# Patient Record
Sex: FEMALE | Race: WHITE
[De-identification: names, ages, dates, MRNs, and addresses within clinical notes are randomized per-mention and may not be internally consistent; named-entity substitution may affect disease eponyms.]

---

## 2018-08-24 ENCOUNTER — RESULT CHARGE (OUTPATIENT)
Age: 68
End: 2018-08-24

## 2018-08-24 ENCOUNTER — APPOINTMENT (OUTPATIENT)
Dept: ENDOCRINOLOGY | Facility: CLINIC | Age: 68
End: 2018-08-24
Payer: MEDICARE

## 2018-08-24 PROCEDURE — 97802 MEDICAL NUTRITION INDIV IN: CPT

## 2018-08-24 PROCEDURE — 95251 CONT GLUC MNTR ANALYSIS I&R: CPT

## 2018-09-04 ENCOUNTER — RECORD ABSTRACTING (OUTPATIENT)
Age: 68
End: 2018-09-04

## 2018-09-04 DIAGNOSIS — Z78.9 OTHER SPECIFIED HEALTH STATUS: ICD-10-CM

## 2018-09-04 DIAGNOSIS — Z87.19 PERSONAL HISTORY OF OTHER DISEASES OF THE DIGESTIVE SYSTEM: ICD-10-CM

## 2018-09-04 DIAGNOSIS — Z83.3 FAMILY HISTORY OF DIABETES MELLITUS: ICD-10-CM

## 2018-09-04 DIAGNOSIS — I25.2 OLD MYOCARDIAL INFARCTION: ICD-10-CM

## 2018-09-04 DIAGNOSIS — Z87.891 PERSONAL HISTORY OF NICOTINE DEPENDENCE: ICD-10-CM

## 2018-09-04 DIAGNOSIS — Z83.49 FAMILY HISTORY OF OTHER ENDOCRINE, NUTRITIONAL AND METABOLIC DISEASES: ICD-10-CM

## 2018-09-04 RX ORDER — IRON POLYSACCHARIDE COMPLEX 200 MG
CAPSULE ORAL
Refills: 0 | Status: ACTIVE | COMMUNITY

## 2018-09-17 ENCOUNTER — RESULT CHARGE (OUTPATIENT)
Age: 68
End: 2018-09-17

## 2018-09-17 ENCOUNTER — APPOINTMENT (OUTPATIENT)
Dept: ENDOCRINOLOGY | Facility: CLINIC | Age: 68
End: 2018-09-17
Payer: MEDICARE

## 2018-09-17 PROCEDURE — G0108 DIAB MANAGE TRN  PER INDIV: CPT

## 2018-09-17 PROCEDURE — 95251 CONT GLUC MNTR ANALYSIS I&R: CPT

## 2018-10-22 ENCOUNTER — APPOINTMENT (OUTPATIENT)
Dept: ENDOCRINOLOGY | Facility: CLINIC | Age: 68
End: 2018-10-22

## 2018-10-30 ENCOUNTER — RECORD ABSTRACTING (OUTPATIENT)
Age: 68
End: 2018-10-30

## 2018-10-30 ENCOUNTER — RX RENEWAL (OUTPATIENT)
Age: 68
End: 2018-10-30

## 2018-11-01 ENCOUNTER — APPOINTMENT (OUTPATIENT)
Dept: ENDOCRINOLOGY | Facility: CLINIC | Age: 68
End: 2018-11-01
Payer: MEDICARE

## 2018-11-01 ENCOUNTER — RESULT CHARGE (OUTPATIENT)
Age: 68
End: 2018-11-01

## 2018-11-01 VITALS
HEIGHT: 61.5 IN | SYSTOLIC BLOOD PRESSURE: 130 MMHG | DIASTOLIC BLOOD PRESSURE: 60 MMHG | WEIGHT: 198 LBS | HEART RATE: 70 BPM | BODY MASS INDEX: 36.9 KG/M2

## 2018-11-01 DIAGNOSIS — I25.10 ATHEROSCLEROTIC HEART DISEASE OF NATIVE CORONARY ARTERY W/OUT ANGINA PECTORIS: ICD-10-CM

## 2018-11-01 DIAGNOSIS — Z95.5 ATHEROSCLEROTIC HEART DISEASE OF NATIVE CORONARY ARTERY W/OUT ANGINA PECTORIS: ICD-10-CM

## 2018-11-01 PROCEDURE — 95251 CONT GLUC MNTR ANALYSIS I&R: CPT

## 2018-11-01 PROCEDURE — 99214 OFFICE O/P EST MOD 30 MIN: CPT | Mod: 25

## 2018-11-01 PROCEDURE — 82962 GLUCOSE BLOOD TEST: CPT

## 2018-11-01 RX ORDER — INSULIN GLARGINE 100 [IU]/ML
100 INJECTION, SOLUTION SUBCUTANEOUS
Refills: 0 | Status: DISCONTINUED | COMMUNITY
End: 2018-11-01

## 2018-11-01 RX ORDER — GABAPENTIN 100 MG/1
100 CAPSULE ORAL
Refills: 0 | Status: DISCONTINUED | COMMUNITY
End: 2018-11-01

## 2018-11-01 RX ORDER — INSULIN LISPRO 100 [IU]/ML
100 INJECTION, SOLUTION INTRAVENOUS; SUBCUTANEOUS
Refills: 0 | Status: DISCONTINUED | COMMUNITY
End: 2018-11-01

## 2018-11-05 LAB — GLUCOSE BLDC GLUCOMTR-MCNC: 125

## 2018-11-09 LAB
HBA1C MFR BLD HPLC: 8.1
LDLC SERPL CALC-MCNC: 74

## 2018-12-06 ENCOUNTER — APPOINTMENT (OUTPATIENT)
Dept: ENDOCRINOLOGY | Facility: CLINIC | Age: 68
End: 2018-12-06

## 2018-12-19 ENCOUNTER — TRANSCRIPTION ENCOUNTER (OUTPATIENT)
Age: 68
End: 2018-12-19

## 2018-12-21 ENCOUNTER — RX RENEWAL (OUTPATIENT)
Age: 68
End: 2018-12-21

## 2019-01-11 ENCOUNTER — RX RENEWAL (OUTPATIENT)
Age: 69
End: 2019-01-11

## 2019-01-24 ENCOUNTER — RECORD ABSTRACTING (OUTPATIENT)
Age: 69
End: 2019-01-24

## 2019-02-01 ENCOUNTER — RX RENEWAL (OUTPATIENT)
Age: 69
End: 2019-02-01

## 2019-02-11 ENCOUNTER — APPOINTMENT (OUTPATIENT)
Dept: ENDOCRINOLOGY | Facility: CLINIC | Age: 69
End: 2019-02-11
Payer: MEDICARE

## 2019-02-11 PROCEDURE — G0108 DIAB MANAGE TRN  PER INDIV: CPT

## 2019-02-14 ENCOUNTER — RESULT CHARGE (OUTPATIENT)
Age: 69
End: 2019-02-14

## 2019-02-20 ENCOUNTER — CHART COPY (OUTPATIENT)
Age: 69
End: 2019-02-20

## 2019-02-21 ENCOUNTER — APPOINTMENT (OUTPATIENT)
Dept: ENDOCRINOLOGY | Facility: CLINIC | Age: 69
End: 2019-02-21

## 2019-02-21 ENCOUNTER — APPOINTMENT (OUTPATIENT)
Dept: ENDOCRINOLOGY | Facility: CLINIC | Age: 69
End: 2019-02-21
Payer: MEDICARE

## 2019-02-21 VITALS
HEART RATE: 73 BPM | SYSTOLIC BLOOD PRESSURE: 120 MMHG | WEIGHT: 190 LBS | BODY MASS INDEX: 35.41 KG/M2 | OXYGEN SATURATION: 98 % | HEIGHT: 61.5 IN | DIASTOLIC BLOOD PRESSURE: 68 MMHG

## 2019-02-21 LAB
CHOLEST SERPL-MCNC: 152
GLUCOSE BLDC GLUCOMTR-MCNC: 144
GLUCOSE SERPL-MCNC: 163
HDLC SERPL-MCNC: 56
LDLC SERPL DIRECT ASSAY-MCNC: 73
MICROALBUMIN/CREAT UR-RTO: 1952

## 2019-02-21 PROCEDURE — 82962 GLUCOSE BLOOD TEST: CPT

## 2019-02-21 PROCEDURE — 99214 OFFICE O/P EST MOD 30 MIN: CPT | Mod: 25

## 2019-02-21 PROCEDURE — 95251 CONT GLUC MNTR ANALYSIS I&R: CPT

## 2019-02-21 RX ORDER — INSULIN GLARGINE 100 [IU]/ML
100 INJECTION, SOLUTION SUBCUTANEOUS
Qty: 3 | Refills: 1 | Status: DISCONTINUED | OUTPATIENT
Start: 2018-09-17 | End: 2019-02-21

## 2019-02-21 NOTE — HISTORY OF PRESENT ILLNESS
[FreeTextEntry1] : Quality: Type 2 DM\par Severity: moderate \par Duration: 1993\par Onset: inactive\par Modifying Factors: Better with medication \par Associated Symptoms: neuropathy. nephropathy. retinopathy  \par \par Current Regimen:\par Humalog 32 units before and before lunch, starting with 40 units before dinner plus sliding scale\par Lantus 35 units at HS\par \par Self blood sugar monitoring: [facility] Personal download - average glucose 190, BS below 70 1%, BS  44%, and BS above 180 55% - high blood sugars at dinner time\par once low of 63 corrected with carb\par \par exercise: gym, 3 times a week\par \par Diet:\par B- low carb wraps, coffee, eggs\par L- very little, sandwich\par D- meat, vegetables, potato, salads\par Snacks- fruit, \par \par Date of last eye exam: 12/2018 (+) diabetic retinopathy \par Date of last foot exam: 3 weeks ago\par Date of last flu vaccine: 2018\par Date of last Pneumovax: 2016

## 2019-02-21 NOTE — REVIEW OF SYSTEMS
[Decreased Appetite] : ~L decreased appetite [Recent Weight Loss (___ Lbs)] : recent [unfilled] ~Ulb weight loss [Neck Pain] : neck pain [SOB on Exertion] : shortness of breath during exertion [Polyuria] : polyuria [Headache] : headaches [Cold Intolerance] : cold intolerant [Easy Bruising] : a tendency for easy bruising [Swelling] : swelling [Fatigue] : no fatigue [Visual Field Defect] : no visual field defect [Blurry Vision] : no blurred vision [Dysphagia] : no dysphagia [Dysphonia] : no dysphonia [Chest Pain] : no chest pain [Palpitations] : no palpitations [Constipation] : no constipation [Diarrhea] : no diarrhea [Dysuria] : no dysuria [Tremors] : no tremors [Depression] : no depression [Anxiety] : no anxiety [Polydipsia] : no polydipsia [Heat Intolerance] : heat tolerant [FreeTextEntry4] : right side from sleeping position  [FreeTextEntry8] : on a diuretic  [de-identified] : not often  [de-identified] : mild in left ankle

## 2019-02-21 NOTE — PHYSICAL EXAM
[Alert] : alert [No Acute Distress] : no acute distress [Well Nourished] : well nourished [Well Developed] : well developed [EOMI] : extra ocular movement intact [Normal Hearing] : hearing was normal [Supple] : the neck was supple [No LAD] : no lymphadenopathy [Thyroid Not Enlarged] : the thyroid was not enlarged [Normal Rate and Effort] : normal respiratory rhythm and effort [No Accessory Muscle Use] : no accessory muscle use [Clear to Auscultation] : lungs were clear to auscultation bilaterally [Normal Rate] : heart rate was normal  [Normal S1, S2] : normal S1 and S2 [Regular Rhythm] : with a regular rhythm [Pedal Pulses Normal] : the pedal pulses are present [Normal Bowel Sounds] : normal bowel sounds [Not Tender] : non-tender [Soft] : abdomen soft [Normal Gait] : normal gait [Acanthosis Nigricans] : no acanthosis nigricans [Right Foot Was Examined] : right foot ~C was examined [Left Foot Was Examined] : left foot ~C was examined [Normal] : normal [Full ROM] : with full range of motion [Diminished Throughout Both Feet] : normal tactile sensation with monofilament testing throughout both feet [No Tremors] : no tremors [Oriented x3] : oriented to person, place, and time [Normal Insight/Judgement] : insight and judgment were intact [Normal Mood] : the mood was normal [de-identified] : trace of edema in bilateral ankles

## 2019-03-20 ENCOUNTER — RESULT CHARGE (OUTPATIENT)
Age: 69
End: 2019-03-20

## 2019-03-20 ENCOUNTER — RESULT REVIEW (OUTPATIENT)
Age: 69
End: 2019-03-20

## 2019-04-05 ENCOUNTER — APPOINTMENT (OUTPATIENT)
Dept: ENDOCRINOLOGY | Facility: CLINIC | Age: 69
End: 2019-04-05
Payer: MEDICARE

## 2019-04-05 VITALS
HEART RATE: 70 BPM | OXYGEN SATURATION: 93 % | DIASTOLIC BLOOD PRESSURE: 70 MMHG | SYSTOLIC BLOOD PRESSURE: 128 MMHG | WEIGHT: 196 LBS | HEIGHT: 61 IN | BODY MASS INDEX: 37 KG/M2

## 2019-04-05 LAB — GLUCOSE BLDC GLUCOMTR-MCNC: 94

## 2019-04-05 PROCEDURE — 82962 GLUCOSE BLOOD TEST: CPT

## 2019-04-05 PROCEDURE — 95251 CONT GLUC MNTR ANALYSIS I&R: CPT

## 2019-04-05 PROCEDURE — 99214 OFFICE O/P EST MOD 30 MIN: CPT | Mod: 25

## 2019-04-05 NOTE — REVIEW OF SYSTEMS
[Recent Weight Gain (___ Lbs)] : recent [unfilled] ~Ulb weight gain [Polyuria] : polyuria [Cold Intolerance] : cold intolerant [Easy Bruising] : a tendency for easy bruising [Fatigue] : no fatigue [Decreased Appetite] : appetite not decreased [Visual Field Defect] : no visual field defect [Blurry Vision] : no blurred vision [Dysphagia] : no dysphagia [Dysphonia] : no dysphonia [Neck Pain] : no neck pain [Chest Pain] : no chest pain [Palpitations] : no palpitations [SOB on Exertion] : no shortness of breath during exertion [Constipation] : no constipation [Diarrhea] : no diarrhea [Dysuria] : no dysuria [Headache] : no headaches [Tremors] : no tremors [Depression] : no depression [Anxiety] : no anxiety [Polydipsia] : no polydipsia [Heat Intolerance] : heat tolerant [Swelling] : no swelling [FreeTextEntry8] : takes diuretic  [de-identified] : takes Effient

## 2019-04-05 NOTE — ASSESSMENT
[FreeTextEntry1] : 69 y/o female with Type 2 DM, Hyperlipidemia, and HTN. Labs from 2/4/19 - , Creatinine 1.74, GFR 10, Microalbumin 1652, chol 152, trig 151, and A1C 8.2%. \par \par Plan: \par Pt. seen nephrology for elevated Creatinine, low GFR, and elevated Microalbumin. Creatinine has improved.\par \par Type 2 DM: Increase Humalog 50 before breakfast and continue 30 units before breakfast and dinner, if having a low carb meal will decrease insulin by 5 units\par - adjust Basaglar dose to 22 units in am and 20 units in pm\par - continue [employer]asha personal sensor\par - educated on healthy food choices\par - encouraged to continue routine exercise\par \par Hyperlipidemia: monitor labs, continue Rosuvastatin\par \par HTN: stable, continue current medication regimen\par \par Labs and follow up visit in 1 month with Dr. Mosquera. \par

## 2019-04-05 NOTE — PHYSICAL EXAM
[Alert] : alert [No Acute Distress] : no acute distress [Well Nourished] : well nourished [Well Developed] : well developed [EOMI] : extra ocular movement intact [Normal Hearing] : hearing was normal [Supple] : the neck was supple [No LAD] : no lymphadenopathy [Thyroid Not Enlarged] : the thyroid was not enlarged [Normal Rate and Effort] : normal respiratory rhythm and effort [No Accessory Muscle Use] : no accessory muscle use [Clear to Auscultation] : lungs were clear to auscultation bilaterally [Normal Rate] : heart rate was normal  [Normal S1, S2] : normal S1 and S2 [Regular Rhythm] : with a regular rhythm [Pedal Pulses Normal] : the pedal pulses are present [Normal Bowel Sounds] : normal bowel sounds [Not Tender] : non-tender [Soft] : abdomen soft [Normal Gait] : normal gait [Acanthosis Nigricans] : no acanthosis nigricans [Right Foot Was Examined] : right foot ~C was examined [Left Foot Was Examined] : left foot ~C was examined [Normal] : normal [Full ROM] : with full range of motion [Diminished Throughout Both Feet] : normal tactile sensation with monofilament testing throughout both feet [No Tremors] : no tremors [Oriented x3] : oriented to person, place, and time [Normal Insight/Judgement] : insight and judgment were intact [Normal Mood] : the mood was normal [de-identified] : trace of edema in bilateral ankles

## 2019-04-05 NOTE — HISTORY OF PRESENT ILLNESS
[FreeTextEntry1] : Quality: Type 2 DM\par Severity: moderate \par Duration: 1993\par Onset: inactive\par Modifying Factors: Better with medication \par Associated Symptoms: neuropathy. nephropathy. retinopathy  \par \par Current Regimen:\par Humalog 30-30-48\par Basaglar 22 units BID\par \par Self blood sugar monitoring: Mario Personal download - average glucose 160, BS below 70 3%, BS  62%, and BS above 180 35% - high blood sugars at dinner time\par Pt. reports having some low blood sugars in 60s over night and once in awhile lows after breakfast \par \par exercise: gym, 3 times a week\par \par Diet: almond flour for cooking \par B- low carb wraps, coffee, eggs\par L- very little, sandwich\par D- meat, vegetables, potato, salads\par Snacks- fruit\par \par Date of last eye exam: 2019 (+) diabetic retinopathy \par Date of last foot exam: appointment next week\par Date of last flu vaccine: 2018\par Date of last Pneumovax: 2016

## 2019-04-17 ENCOUNTER — RESULT CHARGE (OUTPATIENT)
Age: 69
End: 2019-04-17

## 2019-04-17 ENCOUNTER — RESULT REVIEW (OUTPATIENT)
Age: 69
End: 2019-04-17

## 2019-05-08 ENCOUNTER — RX RENEWAL (OUTPATIENT)
Age: 69
End: 2019-05-08

## 2019-05-08 ENCOUNTER — MEDICATION RENEWAL (OUTPATIENT)
Age: 69
End: 2019-05-08

## 2019-05-28 ENCOUNTER — TRANSCRIPTION ENCOUNTER (OUTPATIENT)
Age: 69
End: 2019-05-28

## 2019-05-29 ENCOUNTER — APPOINTMENT (OUTPATIENT)
Dept: ENDOCRINOLOGY | Facility: CLINIC | Age: 69
End: 2019-05-29
Payer: MEDICARE

## 2019-05-29 VITALS
DIASTOLIC BLOOD PRESSURE: 70 MMHG | BODY MASS INDEX: 35.87 KG/M2 | SYSTOLIC BLOOD PRESSURE: 130 MMHG | WEIGHT: 190 LBS | HEIGHT: 61 IN | HEART RATE: 77 BPM

## 2019-05-29 LAB — GLUCOSE BLDC GLUCOMTR-MCNC: 128

## 2019-05-29 PROCEDURE — 82962 GLUCOSE BLOOD TEST: CPT

## 2019-05-29 PROCEDURE — 95251 CONT GLUC MNTR ANALYSIS I&R: CPT

## 2019-05-29 PROCEDURE — 99214 OFFICE O/P EST MOD 30 MIN: CPT | Mod: 25

## 2019-05-29 RX ORDER — ROSUVASTATIN CALCIUM 20 MG/1
20 TABLET, FILM COATED ORAL
Qty: 90 | Refills: 1 | Status: ACTIVE | COMMUNITY
Start: 1900-01-01 | End: 1900-01-01

## 2019-05-29 RX ORDER — ALBUTEROL SULFATE 90 UG/1
108 (90 BASE) AEROSOL, METERED RESPIRATORY (INHALATION)
Refills: 0 | Status: DISCONTINUED | COMMUNITY
End: 2019-05-29

## 2019-05-29 RX ORDER — ISOSORBIDE MONONITRATE 60 MG/1
60 TABLET, EXTENDED RELEASE ORAL DAILY
Refills: 0 | Status: ACTIVE | COMMUNITY

## 2019-05-29 RX ORDER — OMEPRAZOLE 40 MG/1
40 CAPSULE, DELAYED RELEASE ORAL DAILY
Refills: 0 | Status: ACTIVE | COMMUNITY
Start: 2017-11-10

## 2019-05-29 RX ORDER — PRASUGREL HYDROCHLORIDE 10 MG/1
10 TABLET, COATED ORAL DAILY
Refills: 0 | Status: ACTIVE | COMMUNITY

## 2019-05-29 RX ORDER — FUROSEMIDE 20 MG/1
20 TABLET ORAL DAILY
Refills: 0 | Status: ACTIVE | COMMUNITY

## 2019-05-29 RX ORDER — GABAPENTIN 100 MG/1
100 CAPSULE ORAL
Refills: 0 | Status: DISCONTINUED | COMMUNITY
End: 2019-05-29

## 2019-05-29 RX ORDER — OLMESARTAN MEDOXOMIL 20 MG/1
20 TABLET, FILM COATED ORAL DAILY
Refills: 0 | Status: ACTIVE | COMMUNITY

## 2019-05-29 RX ORDER — METOPROLOL SUCCINATE 100 MG/1
100 TABLET, EXTENDED RELEASE ORAL DAILY
Refills: 0 | Status: ACTIVE | COMMUNITY

## 2019-08-14 ENCOUNTER — RX RENEWAL (OUTPATIENT)
Age: 69
End: 2019-08-14

## 2019-09-04 ENCOUNTER — RX RENEWAL (OUTPATIENT)
Age: 69
End: 2019-09-04

## 2019-09-04 ENCOUNTER — APPOINTMENT (OUTPATIENT)
Dept: ENDOCRINOLOGY | Facility: CLINIC | Age: 69
End: 2019-09-04

## 2019-09-12 ENCOUNTER — APPOINTMENT (OUTPATIENT)
Dept: ENDOCRINOLOGY | Facility: CLINIC | Age: 69
End: 2019-09-12
Payer: MEDICARE

## 2019-09-12 PROCEDURE — 36415 COLL VENOUS BLD VENIPUNCTURE: CPT

## 2019-09-13 LAB
ALBUMIN SERPL ELPH-MCNC: 4.1 G/DL
ALP BLD-CCNC: 90 U/L
ALT SERPL-CCNC: 22 U/L
ANION GAP SERPL CALC-SCNC: 13 MMOL/L
AST SERPL-CCNC: 21 U/L
BILIRUB SERPL-MCNC: 0.2 MG/DL
BUN SERPL-MCNC: 50 MG/DL
CALCIUM SERPL-MCNC: 9.2 MG/DL
CHLORIDE SERPL-SCNC: 104 MMOL/L
CHOLEST SERPL-MCNC: 162 MG/DL
CHOLEST/HDLC SERPL: 3.3 RATIO
CO2 SERPL-SCNC: 25 MMOL/L
CREAT SERPL-MCNC: 2.13 MG/DL
ESTIMATED AVERAGE GLUCOSE: 194 MG/DL
GLUCOSE SERPL-MCNC: 183 MG/DL
HBA1C MFR BLD HPLC: 8.4 %
HDLC SERPL-MCNC: 49 MG/DL
LDLC SERPL CALC-MCNC: 67 MG/DL
POTASSIUM SERPL-SCNC: 4.8 MMOL/L
PROT SERPL-MCNC: 6.5 G/DL
SODIUM SERPL-SCNC: 142 MMOL/L
TRIGL SERPL-MCNC: 229 MG/DL

## 2019-09-19 ENCOUNTER — APPOINTMENT (OUTPATIENT)
Dept: ENDOCRINOLOGY | Facility: CLINIC | Age: 69
End: 2019-09-19

## 2019-10-08 ENCOUNTER — RX RENEWAL (OUTPATIENT)
Age: 69
End: 2019-10-08

## 2019-11-06 ENCOUNTER — APPOINTMENT (OUTPATIENT)
Dept: ENDOCRINOLOGY | Facility: CLINIC | Age: 69
End: 2019-11-06

## 2019-11-07 ENCOUNTER — APPOINTMENT (OUTPATIENT)
Dept: ENDOCRINOLOGY | Facility: CLINIC | Age: 69
End: 2019-11-07
Payer: MEDICARE

## 2019-11-07 ENCOUNTER — RESULT CHARGE (OUTPATIENT)
Age: 69
End: 2019-11-07

## 2019-11-07 VITALS
SYSTOLIC BLOOD PRESSURE: 130 MMHG | HEIGHT: 61 IN | WEIGHT: 195 LBS | HEART RATE: 65 BPM | BODY MASS INDEX: 36.82 KG/M2 | DIASTOLIC BLOOD PRESSURE: 84 MMHG

## 2019-11-07 DIAGNOSIS — Z00.00 ENCOUNTER FOR GENERAL ADULT MEDICAL EXAMINATION W/OUT ABNORMAL FINDINGS: ICD-10-CM

## 2019-11-07 LAB — GLUCOSE BLDC GLUCOMTR-MCNC: 114

## 2019-11-07 PROCEDURE — 82962 GLUCOSE BLOOD TEST: CPT

## 2019-11-07 PROCEDURE — 99214 OFFICE O/P EST MOD 30 MIN: CPT | Mod: 25

## 2019-11-07 PROCEDURE — 95251 CONT GLUC MNTR ANALYSIS I&R: CPT

## 2019-11-07 NOTE — ASSESSMENT
[FreeTextEntry1] : Type 2 DM complicated by CKD and proteinuria, neuropathy and retinopathy. Mario VÁZQUEZ reviewed - post lunch and dinner hyperglycemia. pt repots fasting hypoglycemia\par - renal f/u\par - ophthalmology/retina f/u\par - cardio f/u\par - suggest; Humalog 30-40-(35-50)\par - suggest : Basaglar 22 units in am and 16 units in evening\par - asked pt to keep diet log and insulin dosing log with dinner and send logs in1 -2 weeks\par \par \par Hyperlipidemia: improved, cont statin, repeat labs 3 months\par \par HTN: stable, continue current medication regimen\par \par \par

## 2019-11-07 NOTE — DATA REVIEWED
[FreeTextEntry1] : Labs 5/21/19\par Gluc 107, A1c 7.4%\par Cr 1.79, GFR 28\par urine microalb/Cr 3512\par LDL 95, HDL 60, Tg 165\par TSH 1.99\par B12 780\par vit D 33

## 2019-11-07 NOTE — PHYSICAL EXAM
[Alert] : alert [No Acute Distress] : no acute distress [Well Nourished] : well nourished [Well Developed] : well developed [EOMI] : extra ocular movement intact [Normal Hearing] : hearing was normal [Supple] : the neck was supple [No LAD] : no lymphadenopathy [Thyroid Not Enlarged] : the thyroid was not enlarged [Normal Rate and Effort] : normal respiratory rhythm and effort [No Accessory Muscle Use] : no accessory muscle use [Clear to Auscultation] : lungs were clear to auscultation bilaterally [Normal Rate] : heart rate was normal  [Normal S1, S2] : normal S1 and S2 [Regular Rhythm] : with a regular rhythm [Pedal Pulses Normal] : the pedal pulses are present [Normal Bowel Sounds] : normal bowel sounds [Not Tender] : non-tender [Soft] : abdomen soft [Normal Gait] : normal gait [No Tremors] : no tremors [Oriented x3] : oriented to person, place, and time [Normal Insight/Judgement] : insight and judgment were intact [Normal Mood] : the mood was normal [de-identified] : trace of edema in bilateral ankles

## 2019-11-07 NOTE — REVIEW OF SYSTEMS
[Polyuria] : polyuria [Nocturia] : nocturia [Pain/Numbness of Digits] : pain/numbness of digits [Recent Weight Gain (___ Lbs)] : recent [unfilled] ~Ulb weight gain [Blurry Vision] : blurred vision [Chest Pain] : chest pain [Constipation] : constipation [Fatigue] : no fatigue [Decreased Appetite] : appetite not decreased [Visual Field Defect] : no visual field defect [Palpitations] : no palpitations [Shortness Of Breath] : no shortness of breath [SOB on Exertion] : no shortness of breath during exertion [Diarrhea] : no diarrhea [Dysuria] : no dysuria [Depression] : no depression [Anxiety] : no anxiety [Polydipsia] : no polydipsia [Swelling] : no swelling [FreeTextEntry3] : legally blind Right eye [FreeTextEntry5] : following with cardio [FreeTextEntry7] : loose bowels every other day, iron pills [FreeTextEntry8] : takes diuretic

## 2019-11-07 NOTE — HISTORY OF PRESENT ILLNESS
[FreeTextEntry1] : Quality: Type 2 DM\par Severity: moderate \par Duration: 1993\par Onset: not feeling well and diabetes found on blood test\par Associated Symptoms: \par (+) neuropathy.\par (+) nephropathy, CKD with proteinuria following with renal, on ARB\par (+) bilateral proliferative retinopathy  on eye exam 1/2019 - intravitreal injections in Left eye.\par CAD s/p stent, following with cardio\par \par MODIFYING FACTORS: better with medications and worse with diet. fastinh hypoglycemia at times with symptoms\par Current DM Regimen:\par Humalog 30-35-(35-50) takes before meals\par Basaglar 22 units in am and 20 units HS\par Diet: watching diet, eats 3 meals daily and snack in afternoon\par Exercise: walking\par \par SMBG: [employer] Personal download\par CGMS downloaded and reviewed: \par Average glucose: 176\par SD: 56\par % HIGH: 42\par % TARGET: 57\par % LOW: \par Interpretation: improved control. post lunch and dinner hyperglycemia\par --------------------------------------------------------------------------------------------------------------------------------------------------------\par Hyperlipidemia - on statin\par \par \par \par \par \par \par \par \par \par

## 2020-02-06 ENCOUNTER — APPOINTMENT (OUTPATIENT)
Dept: ENDOCRINOLOGY | Facility: CLINIC | Age: 70
End: 2020-02-06
Payer: MEDICARE

## 2020-02-06 PROCEDURE — 36415 COLL VENOUS BLD VENIPUNCTURE: CPT

## 2020-02-07 LAB
ALBUMIN SERPL ELPH-MCNC: 4.5 G/DL
ALP BLD-CCNC: 115 U/L
ALT SERPL-CCNC: 17 U/L
ANION GAP SERPL CALC-SCNC: 15 MMOL/L
AST SERPL-CCNC: 21 U/L
BASOPHILS # BLD AUTO: 0.05 K/UL
BASOPHILS NFR BLD AUTO: 0.7 %
BILIRUB SERPL-MCNC: 0.4 MG/DL
BUN SERPL-MCNC: 48 MG/DL
CALCIUM SERPL-MCNC: 9.7 MG/DL
CHLORIDE SERPL-SCNC: 102 MMOL/L
CHOLEST SERPL-MCNC: 153 MG/DL
CHOLEST/HDLC SERPL: 3.1 RATIO
CO2 SERPL-SCNC: 22 MMOL/L
CREAT SERPL-MCNC: 1.94 MG/DL
CREAT SPEC-SCNC: 91 MG/DL
EOSINOPHIL # BLD AUTO: 0.24 K/UL
EOSINOPHIL NFR BLD AUTO: 3.1 %
ESTIMATED AVERAGE GLUCOSE: 197 MG/DL
GLUCOSE SERPL-MCNC: 196 MG/DL
HBA1C MFR BLD HPLC: 8.5 %
HCT VFR BLD CALC: 33.7 %
HDLC SERPL-MCNC: 49 MG/DL
HGB BLD-MCNC: 10.7 G/DL
IMM GRANULOCYTES NFR BLD AUTO: 0.3 %
LDLC SERPL CALC-MCNC: 60 MG/DL
LYMPHOCYTES # BLD AUTO: 1.96 K/UL
LYMPHOCYTES NFR BLD AUTO: 25.7 %
MAN DIFF?: NORMAL
MCHC RBC-ENTMCNC: 29.9 PG
MCHC RBC-ENTMCNC: 31.8 GM/DL
MCV RBC AUTO: 94.1 FL
MICROALBUMIN 24H UR DL<=1MG/L-MCNC: 119.2 MG/DL
MICROALBUMIN/CREAT 24H UR-RTO: 1310 MG/G
MONOCYTES # BLD AUTO: 0.51 K/UL
MONOCYTES NFR BLD AUTO: 6.7 %
NEUTROPHILS # BLD AUTO: 4.86 K/UL
NEUTROPHILS NFR BLD AUTO: 63.5 %
PLATELET # BLD AUTO: 189 K/UL
POTASSIUM SERPL-SCNC: 4.9 MMOL/L
PROT SERPL-MCNC: 6.9 G/DL
RBC # BLD: 3.58 M/UL
RBC # FLD: 13.1 %
SODIUM SERPL-SCNC: 138 MMOL/L
TRIGL SERPL-MCNC: 217 MG/DL
TSH SERPL-ACNC: 2.65 UIU/ML
WBC # FLD AUTO: 7.64 K/UL

## 2020-02-20 ENCOUNTER — APPOINTMENT (OUTPATIENT)
Dept: ENDOCRINOLOGY | Facility: CLINIC | Age: 70
End: 2020-02-20

## 2020-02-29 ENCOUNTER — APPOINTMENT (OUTPATIENT)
Dept: ENDOCRINOLOGY | Facility: CLINIC | Age: 70
End: 2020-02-29
Payer: MEDICARE

## 2020-02-29 VITALS
HEART RATE: 67 BPM | HEIGHT: 61 IN | DIASTOLIC BLOOD PRESSURE: 82 MMHG | WEIGHT: 189 LBS | BODY MASS INDEX: 35.68 KG/M2 | SYSTOLIC BLOOD PRESSURE: 134 MMHG

## 2020-02-29 LAB — GLUCOSE BLDC GLUCOMTR-MCNC: 163

## 2020-02-29 PROCEDURE — 99214 OFFICE O/P EST MOD 30 MIN: CPT | Mod: 25

## 2020-02-29 PROCEDURE — 82962 GLUCOSE BLOOD TEST: CPT

## 2020-02-29 RX ORDER — ASPIRIN 81 MG
81 TABLET, DELAYED RELEASE (ENTERIC COATED) ORAL DAILY
Refills: 0 | Status: ACTIVE | COMMUNITY

## 2020-02-29 NOTE — REASON FOR VISIT
[Follow-Up: _____] : a [unfilled] follow-up visit [FreeTextEntry1] : worsening Type 2 DM, Hyperlipidemia, and HTN

## 2020-02-29 NOTE — ASSESSMENT
[FreeTextEntry1] : Type 2 DM complicated by CKD and proteinuria, neuropathy and retinopathy. Mario VÁZQUEZ reviewed - post lunch and dinner hyperglycemia. pt repots fasting hypoglycemia, pt has bee taking varying amounts of insulin\par - renal f/u\par - ophthalmology/retina f/u\par - cardio f/u\par - suggest; Humalog 30 untis with meals + scale 151-200 4 units, 201-250 6 units, 251-300 8 units 301-400 10, 401+ 12\par - suggest : Basaglar 16 units in am and 16 units in evening\par - RTO 1 month CDE\par - try and watch diet better\par \par Hyperlipidemia: improved, cont statin, repeat labs 3 months\par \par HTN: stable, continue current medication regimen\par \par \par

## 2020-02-29 NOTE — REVIEW OF SYSTEMS
[Blurry Vision] : blurred vision [Recent Weight Gain (___ Lbs)] : recent [unfilled] ~Ulb weight gain [Chest Pain] : chest pain [Constipation] : constipation [Polyuria] : polyuria [Nocturia] : nocturia [Pain/Numbness of Digits] : pain/numbness of digits [Fatigue] : no fatigue [Decreased Appetite] : appetite not decreased [Visual Field Defect] : no visual field defect [Palpitations] : no palpitations [Shortness Of Breath] : no shortness of breath [SOB on Exertion] : no shortness of breath during exertion [Diarrhea] : no diarrhea [Dysuria] : no dysuria [Depression] : no depression [Anxiety] : no anxiety [Polydipsia] : no polydipsia [Swelling] : no swelling [FreeTextEntry3] : legally blind Right eye [FreeTextEntry7] : loose bowels every other day, iron pills [FreeTextEntry8] : takes diuretic  [FreeTextEntry5] : following with cardio

## 2020-02-29 NOTE — PHYSICAL EXAM
[Alert] : alert [No Acute Distress] : no acute distress [Well Developed] : well developed [Well Nourished] : well nourished [EOMI] : extra ocular movement intact [Supple] : the neck was supple [Normal Hearing] : hearing was normal [Thyroid Not Enlarged] : the thyroid was not enlarged [No LAD] : no lymphadenopathy [Clear to Auscultation] : lungs were clear to auscultation bilaterally [Normal Rate and Effort] : normal respiratory rhythm and effort [No Accessory Muscle Use] : no accessory muscle use [Normal Rate] : heart rate was normal  [Regular Rhythm] : with a regular rhythm [Normal S1, S2] : normal S1 and S2 [Normal Bowel Sounds] : normal bowel sounds [Pedal Pulses Normal] : the pedal pulses are present [Normal Gait] : normal gait [Soft] : abdomen soft [Not Tender] : non-tender [Oriented x3] : oriented to person, place, and time [No Tremors] : no tremors [Normal Mood] : the mood was normal [Normal Insight/Judgement] : insight and judgment were intact [de-identified] : trace of edema in bilateral ankles

## 2020-02-29 NOTE — HISTORY OF PRESENT ILLNESS
[FreeTextEntry1] : Quality: Type 2 DM\par Severity: moderate \par Duration: 1993\par Onset: not feeling well and diabetes found on blood test\par Associated Symptoms: \par (+) neuropathy.\par (+) nephropathy, CKD with proteinuria following with renal, on ARB\par (+) bilateral proliferative retinopathy  on eye exam 1/2019 - intravitreal injections in Left eye.\par CAD s/p stent, following with cardio\par \par MODIFYING FACTORS: better with medications and worse with diet. fastinh hypoglycemia at times with symptoms\par Current DM Regimen:\par Humalog 30-(35-40)-(35-50) takes before meals\par Basaglar 22 units in am and 16 units HS\par Diet: trying keto diet for 1 week, snack a lot on evening\par Exercise: walking\par \par SMBG: [employer] Personal download\par CGMS downloaded and reviewed\par Average glucose: 202\par SD: 61\par % HIGH: 61\par % TARGET: 39\par % LOW: 0\par Interpretation: worsening control, significant daytime hypeglycemia on most days related to meals\par --------------------------------------------------------------------------------------------------------------------------------------------------------\par Hyperlipidemia - on statin\par \par \par \par \par \par \par \par \par \par

## 2020-03-23 ENCOUNTER — APPOINTMENT (OUTPATIENT)
Dept: ENDOCRINOLOGY | Facility: CLINIC | Age: 70
End: 2020-03-23

## 2020-06-05 ENCOUNTER — APPOINTMENT (OUTPATIENT)
Dept: ENDOCRINOLOGY | Facility: CLINIC | Age: 70
End: 2020-06-05

## 2020-06-10 ENCOUNTER — APPOINTMENT (OUTPATIENT)
Dept: ENDOCRINOLOGY | Facility: CLINIC | Age: 70
End: 2020-06-10
Payer: MEDICARE

## 2020-06-10 PROCEDURE — 36415 COLL VENOUS BLD VENIPUNCTURE: CPT

## 2020-06-12 LAB
ALBUMIN SERPL ELPH-MCNC: 4 G/DL
ALP BLD-CCNC: 110 U/L
ALT SERPL-CCNC: 13 U/L
ANION GAP SERPL CALC-SCNC: 17 MMOL/L
AST SERPL-CCNC: 19 U/L
BILIRUB SERPL-MCNC: 0.2 MG/DL
BUN SERPL-MCNC: 56 MG/DL
CALCIUM SERPL-MCNC: 9.2 MG/DL
CHLORIDE SERPL-SCNC: 102 MMOL/L
CHOLEST SERPL-MCNC: 154 MG/DL
CHOLEST/HDLC SERPL: 3.1 RATIO
CO2 SERPL-SCNC: 21 MMOL/L
CREAT SERPL-MCNC: 2.39 MG/DL
ESTIMATED AVERAGE GLUCOSE: 183 MG/DL
GLUCOSE SERPL-MCNC: 179 MG/DL
HBA1C MFR BLD HPLC: 8 %
HDLC SERPL-MCNC: 51 MG/DL
LDLC SERPL CALC-MCNC: 73 MG/DL
POTASSIUM SERPL-SCNC: 4.7 MMOL/L
PROT SERPL-MCNC: 6.5 G/DL
SODIUM SERPL-SCNC: 140 MMOL/L
TRIGL SERPL-MCNC: 151 MG/DL
TSH SERPL-ACNC: 2.8 UIU/ML

## 2020-06-17 ENCOUNTER — APPOINTMENT (OUTPATIENT)
Dept: ENDOCRINOLOGY | Facility: CLINIC | Age: 70
End: 2020-06-17
Payer: MEDICARE

## 2020-06-17 VITALS
HEIGHT: 61 IN | SYSTOLIC BLOOD PRESSURE: 120 MMHG | HEART RATE: 65 BPM | BODY MASS INDEX: 35.12 KG/M2 | WEIGHT: 186 LBS | DIASTOLIC BLOOD PRESSURE: 64 MMHG

## 2020-06-17 LAB — GLUCOSE BLDC GLUCOMTR-MCNC: 125

## 2020-06-17 PROCEDURE — 82962 GLUCOSE BLOOD TEST: CPT

## 2020-06-17 PROCEDURE — 99214 OFFICE O/P EST MOD 30 MIN: CPT | Mod: 25

## 2020-06-17 PROCEDURE — 95251 CONT GLUC MNTR ANALYSIS I&R: CPT

## 2020-06-17 NOTE — HISTORY OF PRESENT ILLNESS
[FreeTextEntry1] : Interval HX - no changes.\par \par Quality: Type 2 DM\par Severity: moderate \par Duration: 1993\par Onset: not feeling well and diabetes found on blood test\par Associated Symptoms: \par (+) neuropathy.\par (+) nephropathy, CKD with proteinuria following with renal, on ARB\par (+) bilateral proliferative retinopathy  on eye exam 1/2019 - intravitreal injections in Left eye.\par CAD s/p stent, following with cardio\par \par MODIFYING FACTORS: better with medications and diet\par Current DM Regimen:\par Humalog 30 untis with meals + scale 151-200 4 units, 201-250 6 units, 251-300 8 units 301-400 10, 401+ 12\par Basaglar 20 units in am and 16 units HS\par \par SMBG: [employer] Personal download\par CGMS downloaded and reviewed\par Average glucose: 181\par % HIGH: 43\par % TARGET: 57\par % LOW: 0\par Interpretation: improved glucoses, hyperglycemia worse with lunch and dinner\par --------------------------------------------------------------------------------------------------------------------------------------------------------\par Hyperlipidemia - on statin\par \par \par \par \par \par \par \par \par \par

## 2020-06-17 NOTE — REVIEW OF SYSTEMS
[Recent Weight Loss (___ Lbs)] : recent weight loss: [unfilled] lbs [Constipation] : constipation [Diarrhea] : diarrhea [Joint Pain] : joint pain [Myalgia] : myalgia  [Pain/Numbness of Digits] : pain/numbness of digits [Chest Pain] : no chest pain [Blurred Vision] : no blurred vision [Shortness Of Breath] : no shortness of breath [SOB on Exertion] : no shortness of breath on exertion [Abdominal Pain] : no abdominal pain [Vomiting] : no vomiting [Nausea] : no nausea [Polyuria] : no polyuria [Nocturia] : no nocturia [Anxiety] : no anxiety [Depression] : no depression [Polydipsia] : no polydipsia [FreeTextEntry9] : needs to see ortho [FreeTextEntry3] : right eye legally blind, left eye floater [FreeTextEntry7] : bouts of diarrhea/constipation

## 2020-06-17 NOTE — PHYSICAL EXAM
[Alert] : alert [Well Nourished] : well nourished [Healthy Appearance] : healthy appearance [No Acute Distress] : no acute distress [Normal Sclera/Conjunctiva] : normal sclera/conjunctiva [EOMI] : extra ocular movement intact [No Accessory Muscle Use] : no accessory muscle use [Clear to Auscultation] : lungs were clear to auscultation bilaterally [Normal S1, S2] : normal S1 and S2 [Normal Rate] : heart rate was normal [No Edema] : no peripheral edema [Normal Bowel Sounds] : normal bowel sounds [Not Tender] : non-tender [Soft] : abdomen soft [Normal Gait] : normal gait [Right Foot Was Examined] : right foot ~C was examined [Left Foot Was Examined] : left foot ~C was examined [Normal] : normal [2+] : 2+ in the dorsalis pedis [Vibration Dec.] : normal vibratory sensation at the level of the toes [Position Sense Dec.] : normal position sense at the level of the toes [Diminished Throughout Both Feet] : normal tactile sensation with monofilament testing throughout both feet [Cranial Nerves Intact] : cranial nerves 2-12 were intact [Oriented x3] : oriented to person, place, and time [Normal Affect] : the affect was normal [Normal Insight/Judgement] : insight and judgment were intact [Normal Mood] : the mood was normal [de-identified] : (+) lipohypertrophy, (+) obese appearance

## 2020-06-17 NOTE — ASSESSMENT
[FreeTextEntry1] : Type 2 DM complicated by CKD and proteinuria, neuropathy and retinopathy. Mario DL reviewed - post lunch and dinner hyperglycemia. \par - renal f/u\par - ophthalmology/retina f/u\par - cardio f/u\par - suggest; Humalog 35 untis with meals + scale 151-200 4 units, 201-250 6 units, 251-300 8 units 301-400 10, 401+ 12\par - suggest : Basaglar 20 units in am and 16 units in evening\par -rotate insulin injections sites\par \par Hyperlipidemia:  cont statin, repeat labs 3 months\par \par HTN: stable, continue current medication regimen\par \par \par

## 2020-10-27 ENCOUNTER — RX RENEWAL (OUTPATIENT)
Age: 70
End: 2020-10-27

## 2020-11-16 ENCOUNTER — APPOINTMENT (OUTPATIENT)
Dept: ENDOCRINOLOGY | Facility: CLINIC | Age: 70
End: 2020-11-16

## 2020-12-01 ENCOUNTER — APPOINTMENT (OUTPATIENT)
Dept: ENDOCRINOLOGY | Facility: CLINIC | Age: 70
End: 2020-12-01

## 2020-12-31 ENCOUNTER — RESULT CHARGE (OUTPATIENT)
Age: 70
End: 2020-12-31

## 2020-12-31 ENCOUNTER — APPOINTMENT (OUTPATIENT)
Dept: ENDOCRINOLOGY | Facility: CLINIC | Age: 70
End: 2020-12-31
Payer: MEDICARE

## 2020-12-31 VITALS
HEIGHT: 61 IN | BODY MASS INDEX: 33.04 KG/M2 | OXYGEN SATURATION: 98 % | TEMPERATURE: 97.7 F | HEART RATE: 58 BPM | SYSTOLIC BLOOD PRESSURE: 122 MMHG | DIASTOLIC BLOOD PRESSURE: 64 MMHG | WEIGHT: 175 LBS

## 2020-12-31 LAB — GLUCOSE BLDC GLUCOMTR-MCNC: 257

## 2020-12-31 PROCEDURE — 95251 CONT GLUC MNTR ANALYSIS I&R: CPT

## 2020-12-31 PROCEDURE — 99215 OFFICE O/P EST HI 40 MIN: CPT | Mod: 25

## 2020-12-31 PROCEDURE — 82962 GLUCOSE BLOOD TEST: CPT

## 2020-12-31 NOTE — PHYSICAL EXAM
[Alert] : alert [Well Nourished] : well nourished [Healthy Appearance] : healthy appearance [No Acute Distress] : no acute distress [Normal Sclera/Conjunctiva] : normal sclera/conjunctiva [EOMI] : extra ocular movement intact [No Accessory Muscle Use] : no accessory muscle use [Clear to Auscultation] : lungs were clear to auscultation bilaterally [Normal S1, S2] : normal S1 and S2 [Normal Rate] : heart rate was normal [No Edema] : no peripheral edema [Normal Bowel Sounds] : normal bowel sounds [Not Tender] : non-tender [Soft] : abdomen soft [Normal Gait] : normal gait [Right Foot Was Examined] : right foot ~C was examined [Left Foot Was Examined] : left foot ~C was examined [Normal] : normal [2+] : 2+ in the dorsalis pedis [Cranial Nerves Intact] : cranial nerves 2-12 were intact [Oriented x3] : oriented to person, place, and time [Normal Affect] : the affect was normal [Normal Insight/Judgement] : insight and judgment were intact [Normal Mood] : the mood was normal [Vibration Dec.] : normal vibratory sensation at the level of the toes [Position Sense Dec.] : normal position sense at the level of the toes [Diminished Throughout Both Feet] : normal tactile sensation with monofilament testing throughout both feet [de-identified] :  (+) obese appearance

## 2020-12-31 NOTE — ASSESSMENT
[FreeTextEntry1] : Type 2 DM complicated by CKD and proteinuria, neuropathy and retinopathy. Mario DL reviewed - persistent hyperglycemia worse during daytime, recent worsening control around holidays, best numbers overnight. not scanning enough\par - renal f/u\par - ophthalmology/retina f/u\par - cardio f/u\par - suggest; Humalog 34 untis with meals + scale 151-200 4 units, 201-250 6 units, 251-300 8 units 301-400 10, 401+ 12\par - suggest : Basaglar 10 units in am and 8 units in evening\par -rotate insulin injections sites\par - confirm abnormal Mario results w FS check\par - scan Mario more\par \par Hyperlipidemia:  cont statin\par \par HTN: stable, continue current medication regimen\par \par pt needs updated labs and will go to lab next month\par f/i 1 month to review labs and Mario DL\par \par \par

## 2020-12-31 NOTE — HISTORY OF PRESENT ILLNESS
[FreeTextEntry1] : Interval HX - no changes. FS this am 222 and did not eat much, reporting overnight lows\par \par Quality: Type 2 DM\par Severity: moderate \par Duration: 1993\par Onset: not feeling well and diabetes found on blood test\par Associated Symptoms: \par (+) neuropathy.\par (+) nephropathy, CKD with proteinuria following with renal, on ARB\par (+) bilateral proliferative retinopathy  on eye exam 1/2019 - intravitreal injections in Left eye.\par CAD s/p stent, following with cardio\par \par MODIFYING FACTORS: better with medications and diet\par Current DM Regimen:\par Humalog 30 units with meals + scale 151-200 4 units, 201-250 6 units, 251-300 8 units 301-400 10, 401+ 12\par Basaglar 8 units in am and 8 units HS\par \par SMBG: Mario\par CGM downloaded and reviewed: Mario\par Average glucose: 194\par % time CGM active: 67\par Glucose variability (target <36%): 31\par \par % VERY HIGH (>250): 16\par % HIGH (181-250): 43\par % TARGET ():41\par % LOW (54-69): 0\par % VERY LOW (<54): 0\par \par Interpretation: persistent hyperglycemia worse during daytime, recent worsening control around holidays, best numbers overnight. not scanning enough\par \par --------------------------------------------------------------------------------------------------------------------------------------------------------\par Hyperlipidemia - on statin\par \par \par \par \par \par \par \par \par \par

## 2020-12-31 NOTE — REVIEW OF SYSTEMS
[Recent Weight Loss (___ Lbs)] : recent weight loss: [unfilled] lbs [Constipation] : constipation [Diarrhea] : diarrhea [Pain/Numbness of Digits] : pain/numbness of digits [Blurred Vision] : no blurred vision [Chest Pain] : no chest pain [Shortness Of Breath] : no shortness of breath [SOB on Exertion] : no shortness of breath on exertion [Nausea] : no nausea [Abdominal Pain] : no abdominal pain [Vomiting] : no vomiting [Polyuria] : polyuria [Nocturia] : nocturia [Depression] : no depression [Anxiety] : no anxiety [Polydipsia] : no polydipsia [FreeTextEntry3] : right eye legally blind, left eye floater [FreeTextEntry7] : bouts of diarrhea/constipation

## 2021-02-02 LAB
CREAT SPEC-SCNC: 3435
HBA1C MFR BLD HPLC: 7.8
LDLC SERPL DIRECT ASSAY-MCNC: 82

## 2021-02-03 ENCOUNTER — APPOINTMENT (OUTPATIENT)
Dept: ENDOCRINOLOGY | Facility: CLINIC | Age: 71
End: 2021-02-03

## 2021-05-07 LAB
HBA1C MFR BLD HPLC: 7.8
LDLC SERPL DIRECT ASSAY-MCNC: 83
MICROALBUMIN/CREAT 24H UR-RTO: 2624

## 2021-05-08 ENCOUNTER — APPOINTMENT (OUTPATIENT)
Dept: ENDOCRINOLOGY | Facility: CLINIC | Age: 71
End: 2021-05-08
Payer: MEDICARE

## 2021-05-08 VITALS
DIASTOLIC BLOOD PRESSURE: 70 MMHG | HEIGHT: 61 IN | WEIGHT: 191 LBS | SYSTOLIC BLOOD PRESSURE: 140 MMHG | HEART RATE: 72 BPM | OXYGEN SATURATION: 95 % | BODY MASS INDEX: 36.06 KG/M2

## 2021-05-08 LAB — GLUCOSE BLDC GLUCOMTR-MCNC: 198

## 2021-05-08 PROCEDURE — 95251 CONT GLUC MNTR ANALYSIS I&R: CPT

## 2021-05-08 PROCEDURE — 99214 OFFICE O/P EST MOD 30 MIN: CPT | Mod: 25

## 2021-05-08 PROCEDURE — 82962 GLUCOSE BLOOD TEST: CPT

## 2021-05-08 RX ORDER — PEN NEEDLE, DIABETIC 29 G X1/2"
32G X 4 MM NEEDLE, DISPOSABLE MISCELLANEOUS
Qty: 4 | Refills: 2 | Status: ACTIVE | COMMUNITY
Start: 2019-02-11 | End: 1900-01-01

## 2021-05-08 NOTE — REVIEW OF SYSTEMS
[Constipation] : constipation [Diarrhea] : diarrhea [Polyuria] : polyuria [Nocturia] : nocturia [Pain/Numbness of Digits] : pain/numbness of digits [Recent Weight Gain (___ Lbs)] : recent weight gain: [unfilled] lbs [Blurred Vision] : no blurred vision [Chest Pain] : no chest pain [Shortness Of Breath] : no shortness of breath [SOB on Exertion] : no shortness of breath on exertion [Nausea] : no nausea [Abdominal Pain] : no abdominal pain [Vomiting] : no vomiting [Depression] : no depression [Anxiety] : no anxiety [Polydipsia] : no polydipsia [FreeTextEntry3] : right eye legally blind, left eye floater [FreeTextEntry7] : bouts of diarrhea/constipation

## 2021-05-08 NOTE — DATA REVIEWED
[FreeTextEntry1] : Labs 3/26/21\par Tg 237, LDL 83\par Gluc 220, A1c 7.8\par Cr 2.04, GFR 24\par TSh 2.20\par urine alb/Cr 2624\par Hb 9.9

## 2021-05-08 NOTE — HISTORY OF PRESENT ILLNESS
[FreeTextEntry1] : Interval HX - no changes. no issues\par \par Quality: Type 2 DM\par Severity: moderate \par Duration: 1993\par Onset: not feeling well and diabetes found on blood test\par Associated Symptoms: \par (+) neuropathy.\par (+) nephropathy, CKD with proteinuria following with renal, on ARB\par (+) bilateral proliferative retinopathy on eye exam 4/2021\par (+) CAD s/p stent, following with cardio\par \par MODIFYING FACTORS: better with medications and diet\par Current DM Regimen:\par Humalog 34 units with meals + scale 151-200 4 units, 201-250 6 units, 251-300 8 units 301-400 10, 401+ 12\par Basaglar 10 units in am and 8 units HS\par \par SMBG: Mario\par CGM downloaded and reviewed: Mario\par Average glucose: 167\par % time CGM active: 84\par Glucose variability (target <36%): 37\par \par % VERY HIGH (>250): 10\par % HIGH (181-250): 29\par % TARGET ():58\par % LOW (54-69): 3\par % VERY LOW (<54): 0\par \par Interpretation: improving control, hypoglycemia after BF and dinner sometimes., FS high after bedtime, some low overnight\par BF 9 am,L 1 pm, Dinner 6 pm, HS snack\par \par --------------------------------------------------------------------------------------------------------------------------------------------------------\par Hyperlipidemia - on statin\par \par \par \par \par \par \par \par \par \par

## 2021-05-08 NOTE — PHYSICAL EXAM
[Alert] : alert [Well Nourished] : well nourished [Healthy Appearance] : healthy appearance [No Acute Distress] : no acute distress [Normal Sclera/Conjunctiva] : normal sclera/conjunctiva [EOMI] : extra ocular movement intact [No Accessory Muscle Use] : no accessory muscle use [Clear to Auscultation] : lungs were clear to auscultation bilaterally [Normal S1, S2] : normal S1 and S2 [Normal Rate] : heart rate was normal [No Edema] : no peripheral edema [Normal Bowel Sounds] : normal bowel sounds [Not Tender] : non-tender [Soft] : abdomen soft [Normal Gait] : normal gait [Cranial Nerves Intact] : cranial nerves 2-12 were intact [Oriented x3] : oriented to person, place, and time [Normal Affect] : the affect was normal [Normal Insight/Judgement] : insight and judgment were intact [Normal Mood] : the mood was normal [de-identified] :  (+) obese appearance

## 2021-05-08 NOTE — ASSESSMENT
[FreeTextEntry1] : Type 2 DM complicated by CKD and proteinuria, neuropathy and retinopathy. Mario DL reviewed - erratic post meal numbers with highs/lows, overnight hyperglycemia, overall improved control. Has beentaking Humalog either 30 min before meals or right with meal. A1c unreliable in setting of anemia from CKD\par - renal f/u\par - ophthalmology/retina f/u\par - cardio f/u\par - cont  Humalog 34 untis with meals + scale 151-200 4 units, 201-250 6 units, 251-300 8 units 301-400 10, 401+ 12\par - take Humalog 15 min before meals, which may improve post meal numbers\par - suggest : Basaglar 16 units in evening\par - rotate insulin injections sites\par - confirm abnormal Mario results w FS check\par - enter events into Mario\par - stop nighttime snack, can have snack if FS <90 before bedtime\par - monitor labs, check fructosamine\par \par Hyperlipidemia: stable,  cont statin\par \par HTN: stable, continue toprol, furosemide\par \par \par \par \par

## 2021-10-18 LAB
HBA1C MFR BLD HPLC: 8
LDLC SERPL DIRECT ASSAY-MCNC: 91

## 2021-10-19 ENCOUNTER — APPOINTMENT (OUTPATIENT)
Dept: ENDOCRINOLOGY | Facility: CLINIC | Age: 71
End: 2021-10-19
Payer: MEDICARE

## 2021-10-19 VITALS
HEIGHT: 61 IN | HEART RATE: 64 BPM | BODY MASS INDEX: 36.82 KG/M2 | OXYGEN SATURATION: 99 % | DIASTOLIC BLOOD PRESSURE: 70 MMHG | SYSTOLIC BLOOD PRESSURE: 110 MMHG | WEIGHT: 195 LBS

## 2021-10-19 DIAGNOSIS — I10 ESSENTIAL (PRIMARY) HYPERTENSION: ICD-10-CM

## 2021-10-19 LAB — GLUCOSE BLDC GLUCOMTR-MCNC: 195

## 2021-10-19 PROCEDURE — 95251 CONT GLUC MNTR ANALYSIS I&R: CPT

## 2021-10-19 PROCEDURE — 82962 GLUCOSE BLOOD TEST: CPT

## 2021-10-19 PROCEDURE — 99214 OFFICE O/P EST MOD 30 MIN: CPT | Mod: 25

## 2021-10-19 NOTE — HISTORY OF PRESENT ILLNESS
[FreeTextEntry1] : Interval HX - no changes. no issues, hyperglycemia with steroid injections and stopped doing injections and now doing acupuncture\par (+) recent UTI\par \par Quality: Type 2 DM\par Severity: moderate \par Duration: 1993\par Onset: not feeling well and diabetes found on blood test\par Associated Symptoms: \par (+) neuropathy.\par (+) nephropathy, CKD with proteinuria following with renal, on ARB\par (+) bilateral proliferative retinopathy on eye exam 4/2021, following w retina\par (+) CAD s/p stent, following with cardio\par \par MODIFYING FACTORS: better with medications and diet, not hungry - eats 2 meals, brunch 11:30 am and dinner 5:30's \par Current DM Regimen:\par Humalog 34 untis with meals + scale 151-200 4 units, 201-250 6 units, 251-300 8 units 301-400 10, 401+ 12\par Basaglar 18 units at bedtime\par \par SMBG: Mario\par CGM downloaded and reviewed: Mario\par Average glucose: 173\par % time CGM active: 76\par Glucose variability (target <36%): 36\par \par % VERY HIGH (>250): 14\par % HIGH (181-250): 26\par % TARGET ():58\par % LOW (54-69): 2\par % VERY LOW (<54): 0\par \par Interpretation: good control overnight, midday hyperglycemia,  evening hypoglycemia around 5-6 pm on most days, other days with variable control, missing data\par \par --------------------------------------------------------------------------------------------------------------------------------------------------------\par Hyperlipidemia - on statin\par \par \par \par \par \par \par \par \par \par

## 2021-10-19 NOTE — PHYSICAL EXAM
[Alert] : alert [Healthy Appearance] : healthy appearance [No Acute Distress] : no acute distress [Normal Sclera/Conjunctiva] : normal sclera/conjunctiva [EOMI] : extra ocular movement intact [No Accessory Muscle Use] : no accessory muscle use [Clear to Auscultation] : lungs were clear to auscultation bilaterally [Normal S1, S2] : normal S1 and S2 [Normal Rate] : heart rate was normal [No Edema] : no peripheral edema [Normal Bowel Sounds] : normal bowel sounds [Not Tender] : non-tender [Soft] : abdomen soft [Normal Gait] : normal gait [Cranial Nerves Intact] : cranial nerves 2-12 were intact [Oriented x3] : oriented to person, place, and time [Normal Affect] : the affect was normal [Normal Insight/Judgement] : insight and judgment were intact [Normal Mood] : the mood was normal [Obese] : obese [No LAD] : no lymphadenopathy [No Thyroid Nodules] : no palpable thyroid nodules [de-identified] :  (+) obese appearance

## 2021-10-19 NOTE — REVIEW OF SYSTEMS
[Recent Weight Gain (___ Lbs)] : recent weight gain: [unfilled] lbs [Constipation] : constipation [Diarrhea] : diarrhea [Polyuria] : polyuria [Nocturia] : nocturia [Pain/Numbness of Digits] : pain/numbness of digits [Decreased Appetite] : decreased appetite [Insomnia] : insomnia [Blurred Vision] : no blurred vision [Chest Pain] : no chest pain [Shortness Of Breath] : no shortness of breath [SOB on Exertion] : no shortness of breath on exertion [Nausea] : no nausea [Abdominal Pain] : no abdominal pain [Vomiting] : no vomiting [Depression] : no depression [Anxiety] : no anxiety [Polydipsia] : no polydipsia [FreeTextEntry3] : right eye legally blind, left eye floater [FreeTextEntry7] : bouts of diarrhea/constipation [de-identified] : poor sleep

## 2021-10-19 NOTE — ASSESSMENT
[FreeTextEntry1] : Type 2 DM complicated by CKD and proteinuria, neuropathy and retinopathy. Mario DL reviewed - erratice numbers when she was having UTI and treated w Abx, recent glucoses more controlled in past 3-4 days.   A1c unreliable in setting of anemia from CKD\par - renal f/u\par - ophthalmology/retina f/u\par - cardio f/u\par - cont  Humalog 34 units premeals\par - cont Humalog scale 151-200 4 units, 201-250 6 units, 251-300 8 units 301-400 10, 401+ 12\par - take Humalog 15 min before meals, which may improve post meal numbers\par -  cont Basaglar 18 units in evening\par - rotate insulin injections sites\par - confirm abnormal Mario results w FS check\par - enter events into Mario; use either phone or Mario reader to monitor sugars do not use both\par - monitor labs, check fructosamine\par \par Hyperlipidemia: stable,  cont statin\par \par HTN: stable, continue toprol, furosemide\par \par \par \par \par

## 2021-10-19 NOTE — DATA REVIEWED
[FreeTextEntry1] : Labs 3/26/21\par Tg 237, LDL 83\par Gluc 220, A1c 7.8\par Cr 2.04, GFR 24\par TSh 2.20\par urine alb/Cr 2624\par Hb 9.9\par \par labs  7/14/21\par Tg 274, LDL 91\par Gluc 157, A1c 8, fructosamine 358\par Cr 2.34, GFR 20\par Hb 10.8

## 2022-01-28 ENCOUNTER — RX RENEWAL (OUTPATIENT)
Age: 72
End: 2022-01-28

## 2022-02-26 ENCOUNTER — TRANSCRIPTION ENCOUNTER (OUTPATIENT)
Age: 72
End: 2022-02-26

## 2022-10-14 ENCOUNTER — RX RENEWAL (OUTPATIENT)
Age: 72
End: 2022-10-14

## 2022-12-12 LAB
HBA1C MFR BLD HPLC: 7.9
LDLC SERPL DIRECT ASSAY-MCNC: 76
MICROALBUMIN/CREAT 24H UR-RTO: 1333

## 2022-12-13 ENCOUNTER — APPOINTMENT (OUTPATIENT)
Dept: ENDOCRINOLOGY | Facility: CLINIC | Age: 72
End: 2022-12-13

## 2022-12-13 VITALS
HEART RATE: 58 BPM | OXYGEN SATURATION: 96 % | DIASTOLIC BLOOD PRESSURE: 80 MMHG | BODY MASS INDEX: 42.58 KG/M2 | HEIGHT: 55 IN | WEIGHT: 184 LBS | SYSTOLIC BLOOD PRESSURE: 130 MMHG

## 2022-12-13 PROCEDURE — 99215 OFFICE O/P EST HI 40 MIN: CPT | Mod: 25,95

## 2022-12-13 RX ORDER — VITAMIN B COMPLEX
CAPSULE ORAL
Refills: 0 | Status: ACTIVE | COMMUNITY

## 2022-12-13 RX ORDER — UBIDECARENONE/VIT E ACET 100MG-5
CAPSULE ORAL
Refills: 0 | Status: ACTIVE | COMMUNITY

## 2022-12-14 NOTE — REASON FOR VISIT
[Follow - Up] : a follow-up visit [DM Type 2] : DM Type 2 [Other___] : [unfilled] [Home] : at home, [unfilled] , at the time of the visit. [Medical Office: ([facility])___] : at the medical office located in  [Patient] : the patient [Family Member] : family member

## 2023-01-05 NOTE — DATA REVIEWED
[FreeTextEntry1] : Labs 3/26/21\par Tg 237, LDL 83\par Gluc 220, A1c 7.8\par Cr 2.04, GFR 24\par TSh 2.20\par urine alb/Cr 2624\par Hb 9.9\par \par labs  7/14/21\par Tg 274, LDL 91\par Gluc 157, A1c 8, fructosamine 358\par Cr 2.34, GFR 20\par Hb 10.8\par \par Labs 12/1/22\par urine alb/Cr 1333, UA (+) protein\par Tg 246, LDL 76, HDL 45\par Gluc 221, A1c 7.9\par Cr 2.78, GFR 17

## 2023-01-05 NOTE — HISTORY OF PRESENT ILLNESS
[FreeTextEntry1] : Interval HX - last seen 10/2021, s/p covid 10/2022\par patient complains of erratic sugars and lingering cough post covid, work up w CT scan pending\par daughter reports: severe hypoglycemia in evening/before dinner, memory loss/issues, thinks she is getting too much insulin, decreased PO intake, stable eye disease and has not gotton any recent injections\par she lives with daughter\par \par Quality: Type 2 DM\par Severity: moderate \par Duration: 1993\par Onset: not feeling well and diabetes found on blood test\par Associated Symptoms: \par (+) neuropathy.\par (+) nephropathy, CKD with proteinuria following with renal, on ARB\par (+) bilateral proliferative retinopathy on eye exam 4/2021, following w retina (Dr Osorio, [place])\par (+) CAD s/p stent, following with cardio (Dr Tello)\par \par MODIFYING FACTORS: better with medications and diet, not hungry - eats 2 meals, brunch 11:30 am and dinner 5:30's \par Current DM Regimen:\par Humalog 34 untis with meals + scale 151-200 2 units, 201-250 4 units, 251-300 6units 301-400 8, 401+ 10 (sometimes takes insulin after meals)\par Basaglar 18 units at bedtime\par \par SMBG: Mario\par CGM downloaded and reviewed: Mario\par Average glucose: 193\par % time CGM active: 90\par Glucose variability (target <36%): 39\par \par % VERY HIGH (>250): 23\par % HIGH (181-250): 31\par % TARGET ():44\par % LOW (54-69): 2\par % VERY LOW (<54): 0\par \par Interpretation:acceptable control overnight without hypoglycemia, midday hyperglycemia (after meals),  evening hypoglycemia around 5-6 pm on most days, \par \par --------------------------------------------------------------------------------------------------------------------------------------------------------\par Hyperlipidemia - on statin\par \par \par \par \par \par \par \par \par \par

## 2023-01-05 NOTE — ASSESSMENT
[FreeTextEntry1] : Type 2 DM complicated by CKD and proteinuria, neuropathy and retinopathy. Mario DL reviewed - acceptable control overnight without hypoglycemia, midday hyperglycemia (after meals),  evening hypoglycemia around 5-6 pm on most days. A1c suboptimal\par - counseled pt on adherence with follow up visits, important to asses DM control at least every 3 month to help maintain good/stable control and reduce worsening microvascular disease\par - pt/daughter asked to clarifu insulin scale at home and call back with scale info\par - renal f/u\par - ophthalmology/retina f/u\par - cardio f/u\par - decrease Humalog 24 units premeals, must take insulin before 15 min  meals to avoid pp hypoglycemia\par - cont Humalog scale 151-200 2 units, 201-250 4 units, 251-300 6 units 301-400 8, 401+ 10\par -  cont Basaglar 18 units in evening\par - rotate insulin injections sites\par - confirm abnormal Mario results w FS check\par - trial of Trulicity 0.75 mg weekly, if start this then stop Humalog but cont scale and Basaglar\par - discussed various CGM, given recent hypoglycemia change Mario to Decom, will reach out to CDE\par \par Hyperlipidemia:  cont statin\par \par memory loss - advised neurology evaluation\par \par 50 min spent with pt and daughter - reviewing Mario CGM report, reviewing hx from past year, discussion of issues and medication changes\par \par Glucose Sensor Necessity:  This patient with diabetes (dx: E11.65) performs 4 or more glucose checks per day for the last 60 days utilizing a home blood glucose monitor   The patient is treated with insulin via 3 or more injections daily  This patient requires frequent adjustments to their insulin treatment on the basis of therapeutic continuous glucose monitoring results. (or This patient is adjusting their blood glucose based on data from the continuous glucose monitor.) Also this patient has recent severe hypoglycemia and dimentia and would benefit from CGM to help avoid hypoglycemia. In addition, the patient has been to our office for an evaluation of their diabetes control within the past 6 months.\par \par Patient is adhering to CGM regimen and diabetes treatment plan.\par \par \par \par \par \par

## 2023-01-05 NOTE — REVIEW OF SYSTEMS
[Decreased Appetite] : decreased appetite [Constipation] : constipation [Diarrhea] : diarrhea [Polyuria] : polyuria [Nocturia] : nocturia [Pain/Numbness of Digits] : pain/numbness of digits [Recent Weight Loss (___ Lbs)] : recent weight loss: [unfilled] lbs [As Noted in HPI] : as noted in HPI [Blurred Vision] : no blurred vision [Chest Pain] : no chest pain [Shortness Of Breath] : no shortness of breath [SOB on Exertion] : no shortness of breath on exertion [Nausea] : no nausea [Abdominal Pain] : no abdominal pain [Vomiting] : no vomiting [Polydipsia] : no polydipsia [FreeTextEntry3] : right eye legally blind, left eye floater [FreeTextEntry7] : bouts of diarrhea/constipation [de-identified] : poor sleep

## 2023-03-28 ENCOUNTER — APPOINTMENT (OUTPATIENT)
Dept: ENDOCRINOLOGY | Facility: CLINIC | Age: 73
End: 2023-03-28
Payer: MEDICARE

## 2023-03-28 VITALS
OXYGEN SATURATION: 98 % | WEIGHT: 176 LBS | DIASTOLIC BLOOD PRESSURE: 68 MMHG | SYSTOLIC BLOOD PRESSURE: 120 MMHG | BODY MASS INDEX: 34.55 KG/M2 | HEIGHT: 60 IN | HEART RATE: 64 BPM

## 2023-03-28 DIAGNOSIS — E11.40 TYPE 2 DIABETES MELLITUS WITH DIABETIC NEUROPATHY, UNSPECIFIED: ICD-10-CM

## 2023-03-28 DIAGNOSIS — R41.3 OTHER AMNESIA: ICD-10-CM

## 2023-03-28 LAB — GLUCOSE BLDC GLUCOMTR-MCNC: 101

## 2023-03-28 PROCEDURE — 95251 CONT GLUC MNTR ANALYSIS I&R: CPT

## 2023-03-28 PROCEDURE — 82962 GLUCOSE BLOOD TEST: CPT

## 2023-03-28 PROCEDURE — 99214 OFFICE O/P EST MOD 30 MIN: CPT | Mod: 25

## 2023-03-28 RX ORDER — BLOOD SUGAR DIAGNOSTIC
STRIP MISCELLANEOUS 3 TIMES DAILY
Qty: 300 | Refills: 0 | Status: DISCONTINUED | COMMUNITY
End: 2023-03-28

## 2023-03-28 RX ORDER — FLASH GLUCOSE SENSOR
KIT MISCELLANEOUS
Qty: 6 | Refills: 1 | Status: DISCONTINUED | COMMUNITY
Start: 2019-02-01 | End: 2023-03-28

## 2023-03-28 RX ORDER — FLASH GLUCOSE SCANNING READER
EACH MISCELLANEOUS
Qty: 1 | Refills: 0 | Status: DISCONTINUED | COMMUNITY
Start: 2019-05-08 | End: 2023-03-28

## 2023-03-28 NOTE — REASON FOR VISIT
[Follow - Up] : a follow-up visit [DM Type 2] : DM Type 2 [Other___] : [unfilled] [Family Member] : family member

## 2023-03-28 NOTE — ASSESSMENT
[FreeTextEntry1] : Type 2 DM complicated by CKD and proteinuria, neuropathy and retinopathy. Dexcom reviewed: improved control, still w post meal hyperglycemia\par - increase Trulcity to 1.5 mg weekly\par - cont Humalog 30 units BEFORE meals + scale 151-200 2 units, 201-250 4 units, 251-300 6units 301-400 8, 401+ 10; can decrease to 26 units if increase postmeal hypoglycemia with higher dose Trulcitiy\par - cont Basaglar 16 units at bedtime\par - counseled pt on adherence with follow up visits, important to asses DM control at least every 3 month to help maintain good/stable control and reduce worsening microvascular disease\par - renal f/u\par - ophthalmology/retina f/u\par - cardio f/u\par - rotate insulin injections sites\par - cont Dexcom CGM\par \par Hyperlipidemia:  cont statin\par \par memory loss - advised neurology evaluation, pt advised to keep med list and names of doctors with her at all times\par \par Glucose Sensor Necessity:  This patient with diabetes (dx: E11.65) performs 4 or more glucose checks per day for the last 60 days utilizing a home blood glucose monitor   The patient is treated with insulin via 3 or more injections daily  This patient requires frequent adjustments to their insulin treatment on the basis of therapeutic continuous glucose monitoring results. (or This patient is adjusting their blood glucose based on data from the continuous glucose monitor.) Also this patient has recent severe hypoglycemia and dimentia and would benefit from CGM to help avoid hypoglycemia. In addition, the patient has been to our office for an evaluation of their diabetes control within the past 6 months.\par \par Patient is adhering to CGM regimen and diabetes treatment plan.\par \par \par \par \par \par

## 2023-03-28 NOTE — DATA REVIEWED
[FreeTextEntry1] : Labs 3/26/21\par Tg 237, LDL 83\par Gluc 220, A1c 7.8\par Cr 2.04, GFR 24\par TSh 2.20\par urine alb/Cr 2624\par Hb 9.9\par \par labs  7/14/21\par Tg 274, LDL 91\par Gluc 157, A1c 8, fructosamine 358\par Cr 2.34, GFR 20\par Hb 10.8\par \par Labs 12/1/22\par urine alb/Cr 1333, UA (+) protein\par Tg 246, LDL 76, HDL 45\par Gluc 221, A1c 7.9\par Cr 2.78, GFR 17\par \par Labs 3/10/23\par urine alb/Cr 1814, UA (+) protein\par Gluc 149. A1c 7.6\par Cr 2.82, GFR 17\par anemic, secondary HPT

## 2023-03-28 NOTE — PHYSICAL EXAM
[Alert] : alert [Obese] : obese [No Respiratory Distress] : no respiratory distress [Clear to Auscultation] : lungs were clear to auscultation bilaterally [Normal S1, S2] : normal S1 and S2 [Normal Rate] : heart rate was normal [No Edema] : no peripheral edema [Not Tender] : non-tender [Soft] : abdomen soft [No Stigmata of Cushings Syndrome] : no stigmata of Cushings Syndrome [Normal Gait] : normal gait [Oriented x3] : oriented to person, place, and time [Normal Affect] : the affect was normal [Normal Insight/Judgement] : insight and judgment were intact [Normal Mood] : the mood was normal [de-identified] : (+) bruising and lipohypertrophy on abdomen

## 2023-03-28 NOTE — HISTORY OF PRESENT ILLNESS
[FreeTextEntry1] : Interval HX -\par now using Dexcom\par tolerating Trulicity\par \par Quality: Type 2 DM\par Severity: moderate \par Duration: 1993\par Onset: not feeling well and diabetes found on blood test\par Associated Symptoms: \par (+) neuropathy.\par (+) nephropathy, CKD with proteinuria following with renal ([facility]), on ARB\par (+) bilateral proliferative retinopathy on eye exam 4/2021, following w retina (Dr Osorio, )\par (+) CAD s/p stent, following with cardio (Dr Tello)\par \par MODIFYING FACTORS: better with medications and diet, not hungry - eats 2 meals, brunch 11:30 am and dinner 5:30's \par Current DM Regimen:\par Humalog 30 units with meals + scale 151-200 2 units, 201-250 4 units, 251-300 6units 301-400 8, 401+ 10 (sometimes takes insulin after meals if she forgets to take before meals)\par Basaglar 16 units at bedtime\par Trulicity 0.75 mg weekly\par \par SMBG: Dexcom\par CGM downloaded and reviewed: Dexcom\par Average glucose: 188\par % time CGM active: 97\par Glucose variability (target <36%): 37\par \par % VERY HIGH (>250): 19\par % HIGH (181-250): 31\par % TARGET ():50\par % LOW (54-69): 0\par % VERY LOW (<54): 0\par \par Interpretation:acceptable control overnight without hypoglycemia, midday hyperglycemia (after meals),  evening hypoglycemia around 5-6 pm on most days, \par --------------------------------------------------------------------------------------------------------------------------------------------------------\par Hyperlipidemia - on statin\par \par \par \par \par \par \par \par \par \par

## 2023-06-13 ENCOUNTER — APPOINTMENT (OUTPATIENT)
Dept: ENDOCRINOLOGY | Facility: CLINIC | Age: 73
End: 2023-06-13

## 2023-09-01 LAB
HBA1C MFR BLD HPLC: 7.7
MICROALBUMIN/CREAT 24H UR-RTO: 2176

## 2023-09-05 ENCOUNTER — APPOINTMENT (OUTPATIENT)
Dept: ENDOCRINOLOGY | Facility: CLINIC | Age: 73
End: 2023-09-05
Payer: MEDICARE

## 2023-09-05 VITALS
HEIGHT: 60 IN | DIASTOLIC BLOOD PRESSURE: 66 MMHG | OXYGEN SATURATION: 98 % | HEART RATE: 71 BPM | SYSTOLIC BLOOD PRESSURE: 120 MMHG | BODY MASS INDEX: 36.71 KG/M2 | WEIGHT: 187 LBS

## 2023-09-05 DIAGNOSIS — E11.59 TYPE 2 DIABETES MELLITUS WITH OTHER CIRCULATORY COMPLICATIONS: ICD-10-CM

## 2023-09-05 DIAGNOSIS — E11.3599 TYPE 2 DIABETES MELLITUS WITH PROLIFERATIVE DIABETIC RETINOPATHY WITHOUT MACULAR EDEMA, UNSPECIFIED EYE: ICD-10-CM

## 2023-09-05 LAB — GLUCOSE BLDC GLUCOMTR-MCNC: 160

## 2023-09-05 PROCEDURE — 99214 OFFICE O/P EST MOD 30 MIN: CPT | Mod: 25

## 2023-09-05 PROCEDURE — 82962 GLUCOSE BLOOD TEST: CPT

## 2023-09-05 PROCEDURE — 95251 CONT GLUC MNTR ANALYSIS I&R: CPT

## 2023-09-05 RX ORDER — INSULIN LISPRO 100 [IU]/ML
100 INJECTION, SOLUTION INTRAVENOUS; SUBCUTANEOUS
Qty: 6 | Refills: 3 | Status: ACTIVE | COMMUNITY
Start: 1900-01-01 | End: 1900-01-01

## 2023-09-05 RX ORDER — DULAGLUTIDE 1.5 MG/.5ML
1.5 INJECTION, SOLUTION SUBCUTANEOUS
Qty: 3 | Refills: 1 | Status: DISCONTINUED | COMMUNITY
Start: 2022-12-14 | End: 2023-09-05

## 2023-09-05 RX ORDER — INSULIN GLARGINE 100 [IU]/ML
100 INJECTION, SOLUTION SUBCUTANEOUS
Qty: 2 | Refills: 3 | Status: ACTIVE | COMMUNITY
Start: 2019-02-21 | End: 1900-01-01

## 2023-09-05 NOTE — ASSESSMENT
[FreeTextEntry1] : 72 yr old female: Type 2 DM complicated by worsening CKD and proteinuria, neuropathy and retinopathy. Dexcom reviewed: worsening control off Trulicity - resume Trulciity 0.75 mg weekly (she is hesitant to take higher doses) - cont Humalog 30 units BEFORE meals + scale 151-200 2 units, 201-250 4 units, 251-300 6units 301-400 8, 401+ 10; can decrease to 26 units if increase postmeal hypoglycemia with Trulcitiy - cont Basaglar 16 units at bedtime - renal f/u - ophthalmology/retina f/u - cardio f/u - rotate insulin injections sites!! - cont Dexcom CGM - advised to stop drinking juice which can contribute to hyperglycemia  Hyperlipidemia:  cont statin  Glucose Sensor Necessity:  This patient with diabetes (dx: E11.65) performs 4 or more glucose checks per day for the last 60 days utilizing a home blood glucose monitor   The patient is treated with insulin via 3 or more injections daily  This patient requires frequent adjustments to their insulin treatment on the basis of therapeutic continuous glucose monitoring results. (or This patient is adjusting their blood glucose based on data from the continuous glucose monitor.) Also this patient has recent severe hypoglycemia and dimentia and would benefit from CGM to help avoid hypoglycemia. In addition, the patient has been to our office for an evaluation of their diabetes control within the past 6 months.  Patient is adhering to CGM regimen and diabetes treatment plan.

## 2023-09-05 NOTE — PHYSICAL EXAM
[Alert] : alert [Obese] : obese [No Respiratory Distress] : no respiratory distress [Clear to Auscultation] : lungs were clear to auscultation bilaterally [Normal S1, S2] : normal S1 and S2 [Normal Rate] : heart rate was normal [No Edema] : no peripheral edema [Not Tender] : non-tender [Soft] : abdomen soft [No Stigmata of Cushings Syndrome] : no stigmata of Cushings Syndrome [Normal Gait] : normal gait [Oriented x3] : oriented to person, place, and time [Normal Affect] : the affect was normal [Normal Insight/Judgement] : insight and judgment were intact [Normal Mood] : the mood was normal [Foot Ulcers] : no foot ulcers [2+] : 2+ in the dorsalis pedis [Vibration Dec.] : normal vibratory sensation at the level of the toes [Diminished Throughout Both Feet] : normal tactile sensation with monofilament testing throughout both feet [de-identified] : (+)lipohypertrophy on abdomen

## 2023-09-05 NOTE — DATA REVIEWED
[FreeTextEntry1] : Labs 3/26/21 Tg 237, LDL 83 Gluc 220, A1c 7.8 Cr 2.04, GFR 24 TSh 2.20 urine alb/Cr 2624 Hb 9.9  labs  7/14/21 Tg 274, LDL 91 Gluc 157, A1c 8, fructosamine 358 Cr 2.34, GFR 20 Hb 10.8  Labs 12/1/22 urine alb/Cr 1333, UA (+) protein Tg 246, LDL 76, HDL 45 Gluc 221, A1c 7.9 Cr 2.78, GFR 17  Labs 3/10/23 urine alb/Cr 1814, UA (+) protein Gluc 149. A1c 7.6 Cr 2.82, GFR 17 anemic, secondary HPT  Labs 6/16/23 Cr 2.76, GFR 18 Gluc 200, A1c 7.7. urine alb/Cr 2176

## 2023-11-13 ENCOUNTER — NON-APPOINTMENT (OUTPATIENT)
Age: 73
End: 2023-11-13

## 2023-12-11 LAB
HBA1C MFR BLD HPLC: 6.9
LDLC SERPL DIRECT ASSAY-MCNC: 140

## 2023-12-12 ENCOUNTER — APPOINTMENT (OUTPATIENT)
Dept: ENDOCRINOLOGY | Facility: CLINIC | Age: 73
End: 2023-12-12

## 2024-01-23 ENCOUNTER — APPOINTMENT (OUTPATIENT)
Dept: ENDOCRINOLOGY | Facility: CLINIC | Age: 74
End: 2024-01-23
Payer: MEDICARE

## 2024-01-23 VITALS
SYSTOLIC BLOOD PRESSURE: 120 MMHG | BODY MASS INDEX: 35.93 KG/M2 | WEIGHT: 183 LBS | OXYGEN SATURATION: 98 % | HEIGHT: 60 IN | HEART RATE: 65 BPM | DIASTOLIC BLOOD PRESSURE: 60 MMHG

## 2024-01-23 LAB — GLUCOSE BLDC GLUCOMTR-MCNC: 132

## 2024-01-23 PROCEDURE — 99214 OFFICE O/P EST MOD 30 MIN: CPT

## 2024-01-23 PROCEDURE — 82962 GLUCOSE BLOOD TEST: CPT

## 2024-01-23 PROCEDURE — 95251 CONT GLUC MNTR ANALYSIS I&R: CPT

## 2024-01-23 RX ORDER — PEN NEEDLE, DIABETIC 32GX 5/32"
32G X 4 MM NEEDLE, DISPOSABLE MISCELLANEOUS
Qty: 4 | Refills: 3 | Status: ACTIVE | COMMUNITY
Start: 2022-01-28 | End: 1900-01-01

## 2024-01-23 NOTE — HISTORY OF PRESENT ILLNESS
[FreeTextEntry1] : Interval HX - now using Dexcom stopped using Trulicity on her own worsening renal function and under eval for transplant and HD  Quality: Type 2 DM Severity: moderate Duration: 1993 Onset: not feeling well and diabetes found on blood test Associated Symptoms: (+) neuropathy. (+) nephropathy, CKD with proteinuria following with renal (Dr. STOKESat [facility]), on ARB (+) bilateral proliferative retinopathy on eye exam 4/2021, following w retina (Dr Osorio, ) (+) CAD s/p stent, following with cardio (Dr Tello)  MODIFYING FACTORS: low protein diet due to kidneys, eats 2 meals, brunch 11:30 am and dinner 5:30's , drinks juice daily Current DM Regimen: Humalog 30 units with meals + scale 151-200 2 units, 201-250 4 units, 251-300 6units 301-400 8, 401+ 10 (sometimes takes insulin after meals if she forgets to take before meals) Basaglar 16 units at bedtime if BG low she will not take insulin   SMBG: Dexcom CGM downloaded and reviewed: Dexcom Average glucose: 186 % time CGM active: 73  % VERY HIGH (>250): 20 % HIGH (181-250): 30 % TARGET (): 48 % LOW (54-69): 1 % VERY LOW (<54): 1  Interpretation worsening control off Trulicity, at times low blood sugars during over night and higher blood sugars with meals  Current   eye appointment next month and cardiology appointment next week -------------------------------------------------------------------------------------------------------------------------------------------------------- Hyperlipidemia - on statin

## 2024-01-23 NOTE — REVIEW OF SYSTEMS
[Fatigue] : no fatigue [Decreased Appetite] : appetite not decreased [Recent Weight Gain (___ Lbs)] : no recent weight gain [Recent Weight Loss (___ Lbs)] : no recent weight loss [Visual Field Defect] : no visual field defect [Blurred Vision] : no blurred vision [Dysphagia] : no dysphagia [Neck Pain] : no neck pain [Dysphonia] : no dysphonia [Chest Pain] : no chest pain [Palpitations] : no palpitations [Constipation] : no constipation [Diarrhea] : no diarrhea [Polyuria] : no polyuria [Dysuria] : no dysuria [Headaches] : no headaches [Tremors] : no tremors [Anxiety] : no anxiety [Depression] : no depression [Polydipsia] : no polydipsia [FreeTextEntry2] : weight stable

## 2024-01-23 NOTE — ASSESSMENT
[FreeTextEntry1] : 73 yr old female: Type 2 DM complicated by worsening CKD and proteinuria, neuropathy and retinopathy. Dexcom reviewed: worsening control off Trulicity, needs updated labs - restart Trulciity 0.75 mg weekly (she is hesitant to take higher doses) - cont Humalog 30 units BEFORE meals + scale 151-200 2 units, 201-250 4 units, 251-300 6units 301-400 8, 401+ 10; can decrease to 26 units if increase postmeal hypoglycemia with Trulcitiy - decrease Basaglar to 14 units at bedtime - renal f/u - ophthalmology/retina f/u - cardio f/u - rotate insulin injections sites!! - cont Dexcom CGM - advised to stop drinking juice which can contribute to hyperglycemia  Hyperlipidemia: continue statin, needs updated labs  Glucose Sensor Necessity: This patient with diabetes (dx: E11.65) performs 4 or more glucose checks per day for the last 60 days utilizing a home blood glucose monitor The patient is treated with insulin via 3 or more injections daily This patient requires frequent adjustments to their insulin treatment on the basis of therapeutic continuous glucose monitoring results. (or This patient is adjusting their blood glucose based on data from the continuous glucose monitor.) Also this patient has recent severe hypoglycemia and dementia and would benefit from CGM to help avoid hypoglycemia. In addition, the patient has been to our office for an evaluation of their diabetes control within the past 6 months.  Patient is adhering to CGM regimen and diabetes treatment plan.  RTO in 3 months with Dr. Gill (due to Dr. Mosquera moving locations)

## 2024-03-21 RX ORDER — BLOOD-GLUCOSE SENSOR
EACH MISCELLANEOUS
Qty: 3 | Refills: 3 | Status: ACTIVE | COMMUNITY
Start: 2023-05-11 | End: 1900-01-01

## 2024-03-21 RX ORDER — BLOOD-GLUCOSE TRANSMITTER
EACH MISCELLANEOUS
Qty: 1 | Refills: 1 | Status: ACTIVE | COMMUNITY
Start: 2023-05-11 | End: 1900-01-01

## 2024-04-23 ENCOUNTER — APPOINTMENT (OUTPATIENT)
Dept: ENDOCRINOLOGY | Facility: CLINIC | Age: 74
End: 2024-04-23
Payer: MEDICARE

## 2024-04-23 VITALS
HEIGHT: 60 IN | WEIGHT: 184 LBS | BODY MASS INDEX: 36.12 KG/M2 | HEART RATE: 71 BPM | OXYGEN SATURATION: 99 % | DIASTOLIC BLOOD PRESSURE: 60 MMHG | SYSTOLIC BLOOD PRESSURE: 130 MMHG

## 2024-04-23 DIAGNOSIS — E78.5 HYPERLIPIDEMIA, UNSPECIFIED: ICD-10-CM

## 2024-04-23 DIAGNOSIS — E11.22 TYPE 2 DIABETES MELLITUS WITH DIABETIC CHRONIC KIDNEY DISEASE: ICD-10-CM

## 2024-04-23 DIAGNOSIS — E11.65 TYPE 2 DIABETES MELLITUS WITH HYPERGLYCEMIA: ICD-10-CM

## 2024-04-23 LAB — GLUCOSE BLDC GLUCOMTR-MCNC: 181

## 2024-04-23 PROCEDURE — 99214 OFFICE O/P EST MOD 30 MIN: CPT

## 2024-04-23 PROCEDURE — 95251 CONT GLUC MNTR ANALYSIS I&R: CPT

## 2024-04-23 PROCEDURE — 82962 GLUCOSE BLOOD TEST: CPT

## 2024-04-23 RX ORDER — DULAGLUTIDE 0.75 MG/.5ML
0.75 INJECTION, SOLUTION SUBCUTANEOUS
Qty: 3 | Refills: 1 | Status: DISCONTINUED | COMMUNITY
Start: 2023-09-05 | End: 2024-04-23

## 2024-04-23 RX ORDER — SEMAGLUTIDE 0.68 MG/ML
2 INJECTION, SOLUTION SUBCUTANEOUS
Qty: 1 | Refills: 3 | Status: ACTIVE | COMMUNITY
Start: 2024-04-23 | End: 1900-01-01

## 2024-04-23 NOTE — REVIEW OF SYSTEMS
[Constipation] : constipation [Fatigue] : no fatigue [Decreased Appetite] : appetite not decreased [Recent Weight Gain (___ Lbs)] : no recent weight gain [Recent Weight Loss (___ Lbs)] : no recent weight loss [Visual Field Defect] : no visual field defect [Blurred Vision] : no blurred vision [Dysphagia] : no dysphagia [Neck Pain] : no neck pain [Dysphonia] : no dysphonia [Chest Pain] : no chest pain [Palpitations] : no palpitations [Diarrhea] : no diarrhea [Polyuria] : no polyuria [Dysuria] : no dysuria [Headaches] : no headaches [Tremors] : no tremors [Depression] : no depression [Anxiety] : no anxiety [Polydipsia] : no polydipsia [FreeTextEntry2] : weight stable [FreeTextEntry7] : drinking stool softner

## 2024-04-23 NOTE — HISTORY OF PRESENT ILLNESS
[FreeTextEntry1] :     Interval HX - now using Dexcom stopped using Trulicity on her own worsening renal function and under eval for transplant and HD  Quality: Type 2 DM Severity: moderate Duration: 1993 Onset: not feeling well and diabetes found on blood test Associated Symptoms: (+) neuropathy. (+) nephropathy, CKD with proteinuria following with renal ([facility]), on ARB (+) bilateral proliferative retinopathy on eye exam 4/2021, following w retina (Dr Osorio, ) (+) CAD s/p stent, following with cardio (Dr Tello)  MODIFYING FACTORS: low protein diet due to kidneys, lows at night but not as many with the lower dose of Basaglar  Current DM Regimen: Humalog 30 units with meals + scale 151-200 2 units, 201-250 4 units, 251-300 6units 301-400 8, 401+ 10 (sometimes takes insulin after meals if she forgets to take before meals) Basaglar 14 units at bedtime if BG low she will not take insulin Trulicity 0.75 mg weekly - has not been taking for 2 weeks due to patient not feeling medication works   SMBG: Dexcom CGM downloaded and reviewed: Dexcom Average glucose: 205 % time CGM active: 100  % VERY HIGH (>250): 25 % HIGH (181-250): 35 % TARGET (): 38 % LOW (54-69): 1 % VERY LOW (<54): <1  Interpretation at times low blood sugars during over night and higher blood sugars with meals  Current   eye appointment in Jan, 2024, right eye blind still, left 20/30 and cardiology appointment in Feb. 2024 -------------------------------------------------------------------------------------------------------------------------------------------------------- Hyperlipidemia - on statin

## 2024-04-23 NOTE — ASSESSMENT
[FreeTextEntry1] : 73 yr old female: Type 2 DM complicated by worsening CKD and proteinuria, neuropathy and retinopathy. Dexcom reviewed: at times lows during overnight then correcting with juice causing hyperglycemia.  - discontinue Trulciity 0.75 mg weekly due to high cost and not effective, start Ozempic 0.25 mg weekly, if tolerating well in 4 weeks increase to 0.5 mg weekly - continue Humalog 30 units BEFORE meals + scale 151-200 2 units, 201-250 4 units, 251-300 6units 301-400 8, 401+ 10; can decrease to 26 units if increase post meal hypoglycemia - decrease Basaglar to 12 units at bedtime - renal f/u - ophthalmology/retina f/u - cardio f/u - rotate insulin injections sites!! - continue Dexcom CGM - advised to stop drinking juice which can contribute to hyperglycemia  Hyperlipidemia: continue statin, needs updated labs  This patient with diabetes - Injects insulin 1+ times daily - Is currently on CGM, testing glucose continuously - Has been seen in the office by a provider within the last six months to review CGM data with their provider CGM is medically necessary for this pt. - CGM will improve/maintain A1c - CGM will reduce hypoglycemic events Dexcom requires one test strip daily to use in case of sensor failure or for blood glucose verification or during sensor warmup.  RTO in 4 months with Dr. Gill (due to Dr. Mosquera moving locations).

## 2024-08-06 ENCOUNTER — APPOINTMENT (OUTPATIENT)
Dept: ENDOCRINOLOGY | Facility: CLINIC | Age: 74
End: 2024-08-06

## 2024-08-06 PROCEDURE — 99215 OFFICE O/P EST HI 40 MIN: CPT

## 2024-08-06 PROCEDURE — 82962 GLUCOSE BLOOD TEST: CPT

## 2024-08-06 PROCEDURE — G0447 BEHAVIOR COUNSEL OBESITY 15M: CPT | Mod: 59

## 2024-08-06 PROCEDURE — G2211 COMPLEX E/M VISIT ADD ON: CPT

## 2024-08-06 NOTE — ASSESSMENT
[FreeTextEntry1] : 73 y.o. Female with PMHx of T2D, HTN, CAD, s/p CABG x 4 with in graft stenosis x 3, s/p PCI x 9, HLD CKD, B/l proliferative retinopathy, follows for DM management. Transfers care from Dr. Mosquera. Last seen by Robyn Hernandez NP. Dx with T2D in 1993  Currently on: Basaglar 12 -18U qhs Humalog 30U with meals Ozempic started on the last visit but was stopped by Nephrologist Previously on Trulicity  Has Dexcom but run out of sensors.   # Moderately controlled T2D  No recent labs. Will do them in the next few days She has confusing understanding of the management. Randomly sliding Basaglar and even not taking if BG <150 Takes Humalog after meals. Not very comfortable using correction. I spent long time discussing the insulin administration and gave her written recommendations as follow: Start Basaglar 14U qhs Humalog 30U before meals + 2U correction for every 50 over 151 Patient, although elderly, she is former principal, and I would expect to retain this.  Dexcom sensors refilled  # Obesity Now on special renal diet by Nephrologist Which helped her to lose 17Lb since last visit  # CKD Reportedly ESRD but not on HD Ozempic stopped by Nephrology with expectation for HD F/u with Nephrologist at [place]. (Actually, all her doctors are at [place] except Endocrinology - her daughter had preference to transfer her to us)  # CAD and HLD Continue statin and management as per her cardiologist.   F/u with NP in 3 months F/u with me in 6 months.

## 2024-08-06 NOTE — PHYSICAL EXAM
[Alert] : alert [Obese] : obese [No Acute Distress] : no acute distress [Normal Voice/Communication] : normal voice communication [PERRL] : pupils equal, round and reactive to light [Normal Outer Ear/Nose] : the ears and nose were normal in appearance [Normal Hearing] : hearing was normal [Normal TMs] : both tympanic membranes were normal [No Neck Mass] : no neck mass was observed [Thyroid Not Enlarged] : the thyroid was not enlarged [No Thyroid Nodules] : no palpable thyroid nodules [No Respiratory Distress] : no respiratory distress [Clear to Auscultation] : lungs were clear to auscultation bilaterally [Normal Rate] : heart rate was normal [Regular Rhythm] : with a regular rhythm [No Edema] : no peripheral edema [Normal Bowel Sounds] : normal bowel sounds [Soft] : abdomen soft [Normal Supraclavicular Nodes] : no supraclavicular lymphadenopathy [Normal Anterior Cervical Nodes] : no anterior cervical lymphadenopathy [Normal Posterior Cervical Nodes] : no posterior cervical lymphadenopathy [No Clubbing, Cyanosis] : no clubbing  or cyanosis of the fingernails [Normal Reflexes] : deep tendon reflexes were 2+ and symmetric [No Tremors] : no tremors [Oriented x3] : oriented to person, place, and time [Normal Affect] : the affect was normal [Normal Insight/Judgement] : insight and judgment were intact [Normal Mood] : the mood was normal

## 2024-08-06 NOTE — HISTORY OF PRESENT ILLNESS
[FreeTextEntry1] : 73 y.o. Female with PMHx of T2D, HTN, CAD, s/p CABG x 4 with in graft stenosis x 3, s/p PCI x 9, HLD CKD, B/l proliferative retinopathy, follows for DM management. Transfers care from Dr. Mosquera. Last seen by Robyn Hernandez NP. Dx with T2D in 1993

## 2024-08-31 NOTE — REVIEW OF SYSTEMS
4 (moderate pain) [Recent Weight Loss (___ Lbs)] : recent weight loss: [unfilled] lbs [Blurred Vision] : no blurred vision [Chest Pain] : no chest pain [Shortness Of Breath] : no shortness of breath [Nausea] : no nausea [Abdominal Pain] : no abdominal pain [Polyuria] : no polyuria [Nocturia] : no nocturia [Pain/Numbness of Digits] : no pain/numbness of digits [Polydipsia] : no polydipsia

## 2024-11-04 ENCOUNTER — APPOINTMENT (OUTPATIENT)
Dept: ENDOCRINOLOGY | Facility: CLINIC | Age: 74
End: 2024-11-04
Payer: MEDICARE

## 2024-11-04 VITALS
DIASTOLIC BLOOD PRESSURE: 80 MMHG | HEIGHT: 60 IN | BODY MASS INDEX: 31.61 KG/M2 | SYSTOLIC BLOOD PRESSURE: 125 MMHG | WEIGHT: 161 LBS

## 2024-11-04 DIAGNOSIS — E11.22 TYPE 2 DIABETES MELLITUS WITH DIABETIC CHRONIC KIDNEY DISEASE: ICD-10-CM

## 2024-11-04 DIAGNOSIS — E78.5 HYPERLIPIDEMIA, UNSPECIFIED: ICD-10-CM

## 2024-11-04 LAB — GLUCOSE BLDC GLUCOMTR-MCNC: 190

## 2024-11-04 PROCEDURE — 95251 CONT GLUC MNTR ANALYSIS I&R: CPT

## 2024-11-04 PROCEDURE — 99214 OFFICE O/P EST MOD 30 MIN: CPT

## 2024-11-04 PROCEDURE — 82962 GLUCOSE BLOOD TEST: CPT

## 2024-11-04 RX ORDER — BLOOD-GLUCOSE SENSOR
EACH MISCELLANEOUS
Qty: 6 | Refills: 1 | Status: ACTIVE | COMMUNITY
Start: 2024-11-04 | End: 1900-01-01

## 2025-02-27 ENCOUNTER — APPOINTMENT (OUTPATIENT)
Dept: ENDOCRINOLOGY | Facility: CLINIC | Age: 75
End: 2025-02-27

## 2025-03-03 ENCOUNTER — APPOINTMENT (OUTPATIENT)
Dept: ENDOCRINOLOGY | Facility: CLINIC | Age: 75
End: 2025-03-03
Payer: MEDICARE

## 2025-03-03 ENCOUNTER — LABORATORY RESULT (OUTPATIENT)
Age: 75
End: 2025-03-03

## 2025-03-03 VITALS
SYSTOLIC BLOOD PRESSURE: 118 MMHG | HEART RATE: 67 BPM | WEIGHT: 167 LBS | BODY MASS INDEX: 32.79 KG/M2 | DIASTOLIC BLOOD PRESSURE: 62 MMHG | OXYGEN SATURATION: 98 % | HEIGHT: 60 IN

## 2025-03-03 DIAGNOSIS — E78.5 HYPERLIPIDEMIA, UNSPECIFIED: ICD-10-CM

## 2025-03-03 DIAGNOSIS — E11.22 TYPE 2 DIABETES MELLITUS WITH DIABETIC CHRONIC KIDNEY DISEASE: ICD-10-CM

## 2025-03-03 LAB — GLUCOSE BLDC GLUCOMTR-MCNC: 294

## 2025-03-03 PROCEDURE — 99214 OFFICE O/P EST MOD 30 MIN: CPT

## 2025-03-03 PROCEDURE — 95251 CONT GLUC MNTR ANALYSIS I&R: CPT

## 2025-03-03 PROCEDURE — 82962 GLUCOSE BLOOD TEST: CPT

## 2025-03-03 RX ORDER — LANCETS 33 GAUGE
EACH MISCELLANEOUS
Qty: 1 | Refills: 1 | Status: ACTIVE | COMMUNITY
Start: 2025-03-03 | End: 1900-01-01

## 2025-03-03 RX ORDER — BLOOD-GLUCOSE METER
W/DEVICE EACH MISCELLANEOUS
Qty: 1 | Refills: 0 | Status: ACTIVE | COMMUNITY
Start: 2025-03-03 | End: 1900-01-01

## 2025-03-03 RX ORDER — BLOOD SUGAR DIAGNOSTIC
STRIP MISCELLANEOUS
Qty: 100 | Refills: 3 | Status: ACTIVE | COMMUNITY
Start: 2025-03-03 | End: 1900-01-01

## 2025-03-07 LAB
APPEARANCE: ABNORMAL
BACTERIA UR CULT: ABNORMAL
BILIRUBIN URINE: NEGATIVE
BLOOD URINE: ABNORMAL
COLOR: YELLOW
CREAT SPEC-SCNC: 44 MG/DL
GLUCOSE QUALITATIVE U: 500 MG/DL
KETONES URINE: NEGATIVE MG/DL
LEUKOCYTE ESTERASE URINE: ABNORMAL
MICROALBUMIN 24H UR DL<=1MG/L-MCNC: 375.6 MG/DL
MICROALBUMIN/CREAT 24H UR-RTO: 8594 MG/G
NITRITE URINE: NEGATIVE
PH URINE: 5.5
PROTEIN URINE: 300 MG/DL
SPECIFIC GRAVITY URINE: 1.02
UROBILINOGEN URINE: 0.2 MG/DL

## 2025-03-11 DIAGNOSIS — N39.0 URINARY TRACT INFECTION, SITE NOT SPECIFIED: ICD-10-CM

## 2025-05-04 NOTE — ASSESSMENT
[FreeTextEntry1] : 67 y/o female with Type 2 DM, Hyperlipidemia, and HTN. Labs reviewed from 2/4/19 - , Creatinine 1.74, GFR 10, Microalbumin 1652, chol 152, trig 151, and A1C 8.2%. \par \par Plan: \par Pt. will follow up with nephrology for elevated Creatinine, low GFR, and elevated Microalbumin.\par \par Type 2 DM: Increase Humalog to 44 units before dinner plus sliding scale, continue Humalog 32 units before and before lunch plus sliding scale\par - Split Basaglar dose to 18 units in am and 18 units in pm\par - continue tasha personal sensor\par - drop of tasha reader in 1-2 weeks for review\par - educated on healthy food choices\par - encouraged to continue routine exercise\par \par Hyperlipidemia: monitor labs, continue Rosuvastatin\par \par HTN: stable, continue current medication regimen\par \par Follow up in 6 weeks and 3 months with Dr. Mosquera. \par \par Plan reviewed with Torie Floyd NP Opt out

## 2025-05-06 ENCOUNTER — APPOINTMENT (OUTPATIENT)
Dept: ENDOCRINOLOGY | Facility: CLINIC | Age: 75
End: 2025-05-06